# Patient Record
Sex: MALE | Race: WHITE | NOT HISPANIC OR LATINO | Employment: STUDENT | URBAN - METROPOLITAN AREA
[De-identification: names, ages, dates, MRNs, and addresses within clinical notes are randomized per-mention and may not be internally consistent; named-entity substitution may affect disease eponyms.]

---
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
## 2024-01-30 ENCOUNTER — OFFICE VISIT (OUTPATIENT)
Dept: FAMILY MEDICINE CLINIC | Facility: CLINIC | Age: 19
End: 2024-01-30
Payer: COMMERCIAL

## 2024-01-30 VITALS
OXYGEN SATURATION: 97 % | BODY MASS INDEX: 26.52 KG/M2 | WEIGHT: 175 LBS | HEIGHT: 68 IN | HEART RATE: 95 BPM | DIASTOLIC BLOOD PRESSURE: 98 MMHG | SYSTOLIC BLOOD PRESSURE: 132 MMHG | RESPIRATION RATE: 18 BRPM

## 2024-01-30 DIAGNOSIS — K21.9 GERD WITHOUT ESOPHAGITIS: ICD-10-CM

## 2024-01-30 DIAGNOSIS — R13.10 DYSPHAGIA, UNSPECIFIED TYPE: Primary | ICD-10-CM

## 2024-01-30 DIAGNOSIS — R41.840 EASILY DISTRACTABLE ON EXAMINATION: ICD-10-CM

## 2024-01-30 PROBLEM — F32.1 MODERATE SINGLE CURRENT EPISODE OF MAJOR DEPRESSIVE DISORDER: Status: RESOLVED | Noted: 2017-10-05 | Resolved: 2024-01-30

## 2024-01-30 PROBLEM — F43.10 PTSD (POST-TRAUMATIC STRESS DISORDER): Status: RESOLVED | Noted: 2017-10-05 | Resolved: 2024-01-30

## 2024-01-30 PROBLEM — S97.81XA CRUSH INJURY OF RIGHT FOOT: Status: ACTIVE | Noted: 2023-12-19

## 2024-01-30 PROCEDURE — 99213 OFFICE O/P EST LOW 20 MIN: CPT | Performed by: NURSE PRACTITIONER

## 2024-01-30 RX ORDER — FAMOTIDINE 20 MG/1
20 TABLET, FILM COATED ORAL 2 TIMES DAILY
Qty: 60 TABLET | Refills: 0 | Status: SHIPPED | OUTPATIENT
Start: 2024-01-30 | End: 2024-02-29

## 2024-01-30 NOTE — PROGRESS NOTES
"Subjective   Jonathon Forbes is a 18 y.o. male.     History of Present Illness   Estab pt     [place]ron[place] GERd x > 5 years. Here for worsening GERD and dysphagia. Things feel like they get stuck in throat and he has to drink a lot to swallow. Occasional vomiting if he does not have water he has to vomit to clear throat. No sore throat, no voice changes. No vaping, smoking, not a lot of caffeine.  No nausea or hematemesis. He has tried otc TUMs or pepcid in the past. Grandmother has \"ulcerated stomach\".     The following portions of the patient's history were reviewed and updated as appropriate: allergies, current medications, past family history, past medical history, past social history, past surgical history and problem list.    Review of Systems    Objective   Physical Exam  Vitals reviewed.   Constitutional:       Appearance: Normal appearance.   HENT:      Nose: Nose normal.      Mouth/Throat:      Mouth: Mucous membranes are moist.      Pharynx: No posterior oropharyngeal erythema.   Cardiovascular:      Rate and Rhythm: Normal rate and regular rhythm.      Pulses: Normal pulses.      Heart sounds: Normal heart sounds.   Pulmonary:      Effort: Pulmonary effort is normal.      Breath sounds: Normal breath sounds.   Abdominal:      General: Bowel sounds are normal.      Palpations: Abdomen is soft.   Musculoskeletal:         General: Normal range of motion.   Skin:     General: Skin is warm.   Neurological:      General: No focal deficit present.      Mental Status: He is alert.         Vitals:    01/30/24 0926   BP: 132/98   Pulse: 95   Resp: 18   SpO2: 97%     Body mass index is 26.61 kg/m².    Procedures    Assessment & Plan   Problems Addressed this Visit    None  Visit Diagnoses       Dysphagia, unspecified type    -  Primary    Relevant Orders    H. Pylori Breath Test - Breath, Lung    Ambulatory Referral to Gastroenterology    GERD without esophagitis        Relevant Medications    famotidine (Pepcid) 20 MG " tablet    Other Relevant Orders    H. Pylori Breath Test - Breath, Lung    Ambulatory Referral to Gastroenterology    Easily distractable on examination        Relevant Orders    Ambulatory Referral to Neuropsychology (Completed)          Diagnoses         Codes Comments    Dysphagia, unspecified type    -  Primary ICD-10-CM: R13.10  ICD-9-CM: 787.20     GERD without esophagitis     ICD-10-CM: K21.9  ICD-9-CM: 530.81     Easily distractable on examination     ICD-10-CM: R41.840  ICD-9-CM: 799.51           Dysphagia/ GERD- refer GI for scope and possible esophageal stretching, h pylori breath test today, focus on upright sitting, chin forward, thorough chewing. Rx pepcid 20 bid, limit triggers    Refer neuropsych for ADHD testing, follow routine          Education provided in AVS   Return if symptoms worsen or fail to improve.

## 2024-04-23 ENCOUNTER — TELEPHONE (OUTPATIENT)
Dept: GASTROENTEROLOGY | Facility: CLINIC | Age: 19
End: 2024-04-23

## 2024-04-23 ENCOUNTER — OFFICE VISIT (OUTPATIENT)
Dept: GASTROENTEROLOGY | Facility: CLINIC | Age: 19
End: 2024-04-23
Payer: COMMERCIAL

## 2024-04-23 VITALS
TEMPERATURE: 98.9 F | HEIGHT: 68 IN | HEART RATE: 85 BPM | WEIGHT: 188 LBS | SYSTOLIC BLOOD PRESSURE: 118 MMHG | BODY MASS INDEX: 28.49 KG/M2 | DIASTOLIC BLOOD PRESSURE: 81 MMHG

## 2024-04-23 DIAGNOSIS — R13.19 ESOPHAGEAL DYSPHAGIA: Primary | ICD-10-CM

## 2024-04-23 DIAGNOSIS — K21.9 GASTROESOPHAGEAL REFLUX DISEASE, UNSPECIFIED WHETHER ESOPHAGITIS PRESENT: ICD-10-CM

## 2024-04-23 PROCEDURE — 99204 OFFICE O/P NEW MOD 45 MIN: CPT | Performed by: INTERNAL MEDICINE

## 2024-04-23 RX ORDER — PANTOPRAZOLE SODIUM 40 MG/1
40 TABLET, DELAYED RELEASE ORAL DAILY
Qty: 30 TABLET | Refills: 5 | Status: SHIPPED | OUTPATIENT
Start: 2024-04-23

## 2024-04-23 NOTE — H&P (VIEW-ONLY)
Chief Complaint   Patient presents with    Heartburn    Difficulty Swallowing     Subjective   HPI  Jonathon Forbes is a 19 y.o. male who presents today for new patient evaluation.    He complains of heartburn and dysphagia.  Longstanding symptoms.  Describes sensation of food being slow to pass through the middle part of his chest he usually has to uma his solid food with liquids throughout his meals.  No history of a formal food impaction.  He has no history of environmental or seasonal allergies.  Was given a prescription for Pepcid which she took for about 30 days did not feel that this changed his symptoms in any way.  No prior upper endoscopy.    Objective   Vitals:    04/23/24 1413   BP: 118/81   Pulse: 85   Temp: 98.9 °F (37.2 °C)     Physical Exam  Vitals reviewed.   Constitutional:       Appearance: He is well-developed.   HENT:      Head: Normocephalic and atraumatic.   Neurological:      Mental Status: He is alert and oriented to person, place, and time.   Psychiatric:         Behavior: Behavior normal.         Thought Content: Thought content normal.         Judgment: Judgment normal.              Assessment & Plan   Assessment:     1. Esophageal dysphagia    2. Gastroesophageal reflux disease, unspecified whether esophagitis present      Plan:   Schedule EGD to evaluate for possible EoE  Start protonix 40mg/day          Sid Bills M.D.  [facility]  [address] [place] [address]  Office: [phone]

## 2024-04-23 NOTE — PROGRESS NOTES
Chief Complaint   Patient presents with    Heartburn    Difficulty Swallowing     Subjective   HPI  Jonathon Forbes is a 19 y.o. male who presents today for new patient evaluation.    He complains of heartburn and dysphagia.  Longstanding symptoms.  Describes sensation of food being slow to pass through the middle part of his chest he usually has to uma his solid food with liquids throughout his meals.  No history of a formal food impaction.  He has no history of environmental or seasonal allergies.  Was given a prescription for Pepcid which she took for about 30 days did not feel that this changed his symptoms in any way.  No prior upper endoscopy.    Objective   Vitals:    04/23/24 1413   BP: 118/81   Pulse: 85   Temp: 98.9 °F (37.2 °C)     Physical Exam  Vitals reviewed.   Constitutional:       Appearance: He is well-developed.   HENT:      Head: Normocephalic and atraumatic.   Neurological:      Mental Status: He is alert and oriented to person, place, and time.   Psychiatric:         Behavior: Behavior normal.         Thought Content: Thought content normal.         Judgment: Judgment normal.              Assessment & Plan   Assessment:     1. Esophageal dysphagia    2. Gastroesophageal reflux disease, unspecified whether esophagitis present      Plan:   Schedule EGD to evaluate for possible EoE  Start protonix 40mg/day          Sid Bills M.D.  [facility]  [address] [place] [address]  Office: [phone]

## 2024-05-13 ENCOUNTER — HOSPITAL ENCOUNTER (OUTPATIENT)
Facility: HOSPITAL | Age: 19
Setting detail: HOSPITAL OUTPATIENT SURGERY
Discharge: HOME | End: 2024-05-13
Attending: INTERNAL MEDICINE | Admitting: INTERNAL MEDICINE
Payer: COMMERCIAL

## 2024-05-13 ENCOUNTER — ANESTHESIA (OUTPATIENT)
Dept: GASTROENTEROLOGY | Facility: HOSPITAL | Age: 19
End: 2024-05-13
Payer: COMMERCIAL

## 2024-05-13 ENCOUNTER — ANESTHESIA EVENT (OUTPATIENT)
Dept: GASTROENTEROLOGY | Facility: HOSPITAL | Age: 19
End: 2024-05-13
Payer: COMMERCIAL

## 2024-05-13 VITALS
RESPIRATION RATE: 18 BRPM | HEIGHT: 68 IN | BODY MASS INDEX: 28.26 KG/M2 | HEART RATE: 64 BPM | WEIGHT: 186.5 LBS | OXYGEN SATURATION: 97 % | SYSTOLIC BLOOD PRESSURE: 117 MMHG | DIASTOLIC BLOOD PRESSURE: 78 MMHG

## 2024-05-13 DIAGNOSIS — R13.19 ESOPHAGEAL DYSPHAGIA: ICD-10-CM

## 2024-05-13 PROCEDURE — 25010000002 PROPOFOL 1000 MG/100ML EMULSION

## 2024-05-13 PROCEDURE — 43239 EGD BIOPSY SINGLE/MULTIPLE: CPT | Performed by: INTERNAL MEDICINE

## 2024-05-13 PROCEDURE — 25810000003 LACTATED RINGERS PER 1000 ML: Performed by: INTERNAL MEDICINE

## 2024-05-13 PROCEDURE — 25010000002 PROPOFOL 200 MG/20ML EMULSION

## 2024-05-13 PROCEDURE — 88305 TISSUE EXAM BY PATHOLOGIST: CPT | Performed by: INTERNAL MEDICINE

## 2024-05-13 RX ORDER — SODIUM CHLORIDE 0.9 % (FLUSH) 0.9 %
3-10 SYRINGE (ML) INJECTION AS NEEDED
Status: CANCELLED | OUTPATIENT
Start: 2024-05-13

## 2024-05-13 RX ORDER — SODIUM CHLORIDE 0.9 % (FLUSH) 0.9 %
3 SYRINGE (ML) INJECTION EVERY 12 HOURS SCHEDULED
Status: CANCELLED | OUTPATIENT
Start: 2024-05-13

## 2024-05-13 RX ORDER — PROPOFOL 10 MG/ML
INJECTION, EMULSION INTRAVENOUS CONTINUOUS PRN
Status: DISCONTINUED | OUTPATIENT
Start: 2024-05-13 | End: 2024-05-13 | Stop reason: SURG

## 2024-05-13 RX ORDER — LIDOCAINE HYDROCHLORIDE 10 MG/ML
0.5 INJECTION, SOLUTION INFILTRATION; PERINEURAL ONCE AS NEEDED
Status: CANCELLED | OUTPATIENT
Start: 2024-05-13

## 2024-05-13 RX ORDER — SODIUM CHLORIDE, SODIUM LACTATE, POTASSIUM CHLORIDE, CALCIUM CHLORIDE 600; 310; 30; 20 MG/100ML; MG/100ML; MG/100ML; MG/100ML
9 INJECTION, SOLUTION INTRAVENOUS CONTINUOUS
Status: CANCELLED | OUTPATIENT
Start: 2024-05-13

## 2024-05-13 RX ORDER — SODIUM CHLORIDE, SODIUM LACTATE, POTASSIUM CHLORIDE, CALCIUM CHLORIDE 600; 310; 30; 20 MG/100ML; MG/100ML; MG/100ML; MG/100ML
1000 INJECTION, SOLUTION INTRAVENOUS CONTINUOUS
Status: DISCONTINUED | OUTPATIENT
Start: 2024-05-13 | End: 2024-05-13 | Stop reason: HOSPADM

## 2024-05-13 RX ORDER — SODIUM CHLORIDE 0.9 % (FLUSH) 0.9 %
10 SYRINGE (ML) INJECTION AS NEEDED
Status: DISCONTINUED | OUTPATIENT
Start: 2024-05-13 | End: 2024-05-13 | Stop reason: HOSPADM

## 2024-05-13 RX ORDER — PROPOFOL 10 MG/ML
INJECTION, EMULSION INTRAVENOUS AS NEEDED
Status: DISCONTINUED | OUTPATIENT
Start: 2024-05-13 | End: 2024-05-13 | Stop reason: SURG

## 2024-05-13 RX ORDER — LIDOCAINE HYDROCHLORIDE 20 MG/ML
INJECTION, SOLUTION INFILTRATION; PERINEURAL AS NEEDED
Status: DISCONTINUED | OUTPATIENT
Start: 2024-05-13 | End: 2024-05-13 | Stop reason: SURG

## 2024-05-13 RX ADMIN — PROPOFOL INJECTABLE EMULSION 100 MG: 10 INJECTION, EMULSION INTRAVENOUS at 10:36

## 2024-05-13 RX ADMIN — PROPOFOL 250 MCG/KG/MIN: 10 INJECTION, EMULSION INTRAVENOUS at 10:38

## 2024-05-13 RX ADMIN — SODIUM CHLORIDE, POTASSIUM CHLORIDE, SODIUM LACTATE AND CALCIUM CHLORIDE 1000 ML: 600; 310; 30; 20 INJECTION, SOLUTION INTRAVENOUS at 10:17

## 2024-05-13 RX ADMIN — LIDOCAINE HYDROCHLORIDE 60 MG: 20 INJECTION, SOLUTION INFILTRATION; PERINEURAL at 10:36

## 2024-05-13 NOTE — ANESTHESIA POSTPROCEDURE EVALUATION
Patient: Jonathon Forbes    Procedure Summary       Date: 05/13/24 Room / Location:  [facility] /  [facility]    Anesthesia Start: 1033 Anesthesia Stop: 1051    Procedure: ESOPHAGOGASTRODUODENOSCOPY WITH BIOPSIES (Esophagus) Diagnosis:       Esophageal dysphagia      (Esophageal dysphagia [R13.19])    Surgeons: Sid Bills MD Provider: Jeremiah Lambert MD    Anesthesia Type: MAC ASA Status: 1            Anesthesia Type: MAC    Vitals  Vitals Value Taken Time   /81 05/13/24 1058   Temp     Pulse 60 05/13/24 1100   Resp 18 05/13/24 1057   SpO2 96 % 05/13/24 1100   Vitals shown include unfiled device data.        Post Anesthesia Care and Evaluation    Patient location during evaluation: bedside  Patient participation: complete - patient participated  Level of consciousness: awake and alert  Pain management: adequate    Airway patency: patent  Anesthetic complications: No anesthetic complications  PONV Status: controlled  Cardiovascular status: blood pressure returned to baseline and acceptable  Respiratory status: acceptable  Hydration status: acceptable

## 2024-05-13 NOTE — ANESTHESIA PREPROCEDURE EVALUATION
Anesthesia Evaluation     Patient summary reviewed and Nursing notes reviewed   no history of anesthetic complications:   NPO Solid Status: > 8 hours  NPO Liquid Status: > 2 hours           Airway   Dental      Pulmonary    Cardiovascular         Neuro/Psych  GI/Hepatic/Renal/Endo      Musculoskeletal     Abdominal    Substance History      OB/GYN          Other                          Anesthesia Plan    ASA 1     MAC     intravenous induction     Anesthetic plan, risks, benefits, and alternatives have been provided, discussed and informed consent has been obtained with: patient.        CODE STATUS:

## 2024-05-13 NOTE — DISCHARGE INSTRUCTIONS
For the next 24 hours patient needs to be with a responsible adult.    For 24 hours DO NOT drive, operate machinery, appliances, drink alcohol, make important decisions or sign legal documents.    Start with a light or bland diet if you are feeling sick to your stomach otherwise advance to regular diet as tolerated.    Follow recommendations on procedure report if provided by your doctor.    Call Dr Bills for problems .    Problems may include but not limited to: large amounts of bleeding, trouble breathing, repeated vomiting, severe unrelieved pain, fever or chills.

## 2024-05-14 LAB
LAB AP CASE REPORT: NORMAL
PATH REPORT.FINAL DX SPEC: NORMAL
PATH REPORT.GROSS SPEC: NORMAL

## 2024-06-04 ENCOUNTER — TELEPHONE (OUTPATIENT)
Dept: GASTROENTEROLOGY | Facility: CLINIC | Age: 19
End: 2024-06-04

## 2024-06-04 DIAGNOSIS — R13.19 ESOPHAGEAL DYSPHAGIA: Primary | ICD-10-CM

## 2024-06-04 DIAGNOSIS — K21.9 GASTROESOPHAGEAL REFLUX DISEASE, UNSPECIFIED WHETHER ESOPHAGITIS PRESENT: ICD-10-CM

## 2024-06-04 DIAGNOSIS — K20.0 EOSINOPHILIC ESOPHAGITIS: ICD-10-CM

## 2024-06-04 NOTE — TELEPHONE ENCOUNTER
Caller: Jonathon Forbes    Relationship: Self    Best call back number: [phone]     Caller requesting test results: PT     What test was performed: EGD    When was the test performed: 05/13/24    Where was the test performed: [facility]    \

## 2024-06-11 NOTE — TELEPHONE ENCOUNTER
Sid Bills MD Stowers, Sharon G, RN  Caller: Unspecified (1 week ago, 12:51 PM)  Results are suggestive of EoE  Please arrange referral to [facility], Dr Paulo Martinez for allergy testing  Please arrange for trial of topical budesonide 2mg BID for 12 weeks, sent to [facility] pharmacy    Office f/u in 8 weeks with me or SM

## 2024-06-11 NOTE — TELEPHONE ENCOUNTER
Called pt and advised of Dr Bills's note.  Pt verbalized understanding.     F/u appt w/IRAIDA Akins on 9/12 @11am.     Referral placed to Dr Martinez.

## 2024-06-12 ENCOUNTER — OFFICE VISIT (OUTPATIENT)
Dept: FAMILY MEDICINE CLINIC | Facility: CLINIC | Age: 19
End: 2024-06-12
Payer: COMMERCIAL

## 2024-06-12 VITALS
SYSTOLIC BLOOD PRESSURE: 142 MMHG | HEIGHT: 68 IN | WEIGHT: 186 LBS | HEART RATE: 64 BPM | RESPIRATION RATE: 18 BRPM | BODY MASS INDEX: 28.19 KG/M2 | DIASTOLIC BLOOD PRESSURE: 90 MMHG | OXYGEN SATURATION: 98 %

## 2024-06-12 DIAGNOSIS — R53.81 CHRONIC FATIGUE AND MALAISE: Primary | ICD-10-CM

## 2024-06-12 DIAGNOSIS — H53.9 CHANGE IN VISION: ICD-10-CM

## 2024-06-12 DIAGNOSIS — K20.0 ESOPHAGITIS, EOSINOPHILIC: ICD-10-CM

## 2024-06-12 DIAGNOSIS — R53.82 CHRONIC FATIGUE AND MALAISE: Primary | ICD-10-CM

## 2024-06-12 LAB
25(OH)D3+25(OH)D2 SERPL-MCNC: 22.7 NG/ML (ref 30–100)
ALBUMIN SERPL-MCNC: 4.9 G/DL (ref 3.5–5.2)
ALBUMIN/GLOB SERPL: 2.3 G/DL
ALP SERPL-CCNC: 96 U/L (ref 39–117)
ALT SERPL-CCNC: 25 U/L (ref 1–41)
AST SERPL-CCNC: 19 U/L (ref 1–40)
BASOPHILS # BLD AUTO: 0.04 10*3/MM3 (ref 0–0.2)
BASOPHILS NFR BLD AUTO: 0.5 % (ref 0–1.5)
BILIRUB BLD-MCNC: NEGATIVE MG/DL
BILIRUB SERPL-MCNC: 0.4 MG/DL (ref 0–1.2)
BUN SERPL-MCNC: 11 MG/DL (ref 6–20)
BUN/CREAT SERPL: 8.1 (ref 7–25)
CALCIUM SERPL-MCNC: 9.5 MG/DL (ref 8.6–10.5)
CHLORIDE SERPL-SCNC: 104 MMOL/L (ref 98–107)
CLARITY, POC: CLEAR
CO2 SERPL-SCNC: 26.6 MMOL/L (ref 22–29)
COLOR UR: YELLOW
CREAT SERPL-MCNC: 1.35 MG/DL (ref 0.76–1.27)
EGFRCR SERPLBLD CKD-EPI 2021: 77.1 ML/MIN/1.73
EOSINOPHIL # BLD AUTO: 0.17 10*3/MM3 (ref 0–0.4)
EOSINOPHIL NFR BLD AUTO: 2.2 % (ref 0.3–6.2)
ERYTHROCYTE [DISTWIDTH] IN BLOOD BY AUTOMATED COUNT: 12.3 % (ref 12.3–15.4)
FOLATE SERPL-MCNC: 12.8 NG/ML (ref 4.78–24.2)
GLOBULIN SER CALC-MCNC: 2.1 GM/DL
GLUCOSE SERPL-MCNC: 95 MG/DL (ref 65–99)
GLUCOSE UR STRIP-MCNC: NEGATIVE MG/DL
HCT VFR BLD AUTO: 47.5 % (ref 37.5–51)
HGB BLD-MCNC: 15.6 G/DL (ref 13–17.7)
IMM GRANULOCYTES # BLD AUTO: 0.01 10*3/MM3 (ref 0–0.05)
IMM GRANULOCYTES NFR BLD AUTO: 0.1 % (ref 0–0.5)
KETONES UR QL: NEGATIVE
LEUKOCYTE EST, POC: NEGATIVE
LYMPHOCYTES # BLD AUTO: 3.97 10*3/MM3 (ref 0.7–3.1)
LYMPHOCYTES NFR BLD AUTO: 50.8 % (ref 19.6–45.3)
MCH RBC QN AUTO: 27.6 PG (ref 26.6–33)
MCHC RBC AUTO-ENTMCNC: 32.8 G/DL (ref 31.5–35.7)
MCV RBC AUTO: 84.1 FL (ref 79–97)
MONOCYTES # BLD AUTO: 0.39 10*3/MM3 (ref 0.1–0.9)
MONOCYTES NFR BLD AUTO: 5 % (ref 5–12)
NEUTROPHILS # BLD AUTO: 3.23 10*3/MM3 (ref 1.7–7)
NEUTROPHILS NFR BLD AUTO: 41.4 % (ref 42.7–76)
NITRITE UR-MCNC: NEGATIVE MG/ML
NRBC BLD AUTO-RTO: 0 /100 WBC (ref 0–0.2)
PH UR: 6 [PH] (ref 5–8)
PLATELET # BLD AUTO: 311 10*3/MM3 (ref 140–450)
POTASSIUM SERPL-SCNC: 4 MMOL/L (ref 3.5–5.2)
PROT SERPL-MCNC: 7 G/DL (ref 6–8.5)
PROT UR STRIP-MCNC: NEGATIVE MG/DL
RBC # BLD AUTO: 5.65 10*6/MM3 (ref 4.14–5.8)
RBC # UR STRIP: NEGATIVE /UL
SODIUM SERPL-SCNC: 142 MMOL/L (ref 136–145)
SP GR UR: 1.03 (ref 1–1.03)
T4 FREE SERPL-MCNC: 1.41 NG/DL (ref 0.92–1.68)
TSH SERPL DL<=0.005 MIU/L-ACNC: 2.58 UIU/ML (ref 0.27–4.2)
UROBILINOGEN UR QL: NORMAL
VIT B12 SERPL-MCNC: 852 PG/ML (ref 211–946)
WBC # BLD AUTO: 7.81 10*3/MM3 (ref 3.4–10.8)

## 2024-06-12 PROCEDURE — 81002 URINALYSIS NONAUTO W/O SCOPE: CPT | Performed by: NURSE PRACTITIONER

## 2024-06-12 PROCEDURE — 99213 OFFICE O/P EST LOW 20 MIN: CPT | Performed by: NURSE PRACTITIONER

## 2024-06-12 RX ORDER — ERGOCALCIFEROL 1.25 MG/1
50000 CAPSULE ORAL WEEKLY
Qty: 8 CAPSULE | Refills: 0 | Status: SHIPPED | OUTPATIENT
Start: 2024-06-12 | End: 2024-08-11

## 2024-06-12 NOTE — PROGRESS NOTES
Subjective   Jonathon Forbes is a 19 y.o. male.     History of Present Illness   Estab pt here for acute issue    Acute malaise , fatigue and feeling unwell x 2 months. He feels like brain fog or dissociation. He does not see therapist or psychiatry. Has had a history of migraines without aura, but in last month has had a dark line in R eye vision in bottom right corner that comes and goes when he is looking at bright light or sun.   Last CBC 12/2023: wbc 7.7, hgb 15.4, ALT 66, AST 29. Creat 1.19., GFR 90  No changes to bowel, bladder, no new sex partners, no drugs or alcohol. 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 Sleeps 6-7 hrs most nights. He has meat heavy diet and veggies. He is drinking a lot of water. No chest pain, palps, no respiratory issues. No recent illness. No tick bites.    The following portions of the patient's history were reviewed and updated as appropriate: allergies, current medications, past family history, past medical history, past social history, past surgical history and problem list.    Review of Systems      Current Outpatient Medications:     pantoprazole (PROTONIX) 40 MG EC tablet, Take 1 tablet by mouth Daily., Disp: 30 tablet, Rfl: 5    Objective   Physical Exam  Vitals reviewed.   Constitutional:       Appearance: Normal appearance.   HENT:      Mouth/Throat:      Mouth: Mucous membranes are moist.   Eyes:      Extraocular Movements: Extraocular movements intact.      Pupils: Pupils are equal, round, and reactive to light.   Cardiovascular:      Rate and Rhythm: Normal rate and regular rhythm.      Pulses: Normal pulses.      Heart sounds: Normal heart sounds.   Pulmonary:      Effort: Pulmonary effort is normal.      Breath sounds: Normal breath sounds.   Abdominal:      General: Bowel sounds are normal.   Musculoskeletal:         General: Normal range of motion.      Cervical back: Normal range of motion.   Lymphadenopathy:      Cervical: No cervical adenopathy.  "  Skin:     General: Skin is warm.      Findings: No rash.   Neurological:      General: No focal deficit present.      Mental Status: He is alert.         Vitals:    06/12/24 0746   BP: 142/90   Pulse: 64   Resp: 18   SpO2: 98%     Body mass index is 28.28 kg/m².    Procedures    No results found for: \"CMP\", \"BMP\", \"TSH\", \"CBC\", \"HGBA1C\", \"LIPIDINTERP\"         Assessment & Plan   Problems Addressed this Visit    None  Visit Diagnoses       Chronic fatigue and malaise    -  Primary    Relevant Orders    Comprehensive Metabolic Panel    CBC & Differential    TSH    T4, Free    Vitamin B12    Folate    Vitamin D 25 hydroxy    POC Urinalysis Dipstick (Completed)    Change in vision        Esophagitis, eosinophilic              Diagnoses         Codes Comments    Chronic fatigue and malaise    -  Primary ICD-10-CM: R53.82, R53.81  ICD-9-CM: 780.71     Change in vision     ICD-10-CM: H53.9  ICD-9-CM: 368.9     Esophagitis, eosinophilic     ICD-10-CM: K20.0  ICD-9-CM: 530.13           Orders Placed This Encounter   Procedures    Comprehensive Metabolic Panel     Order Specific Question:   Release to patient     Answer:   Routine Release [[id number]]    TSH     Order Specific Question:   Release to patient     Answer:   Routine Release [[id number]]    T4, Free     Order Specific Question:   Release to patient     Answer:   Routine Release [[id number]]    Vitamin B12     Order Specific Question:   Release to patient     Answer:   Routine Release [[id number]]    Folate     Order Specific Question:   Release to patient     Answer:   Routine Release [[id number]]    Vitamin D 25 hydroxy     Order Specific Question:   Release to patient     Answer:   Routine Release [[id number]]    POC Urinalysis Dipstick     Order Specific Question:   Release to patient     Answer:   Routine Release [[id number]]    CBC & Differential     Order Specific Question:   Release to patient     Answer:   Routine Release [[id number]]     Malaise- check " labs, urine, focus on hydration, water, sleep, mvt daily, limit stress    Vision changes- get eye exam asap, reviewed migraine prevention, wear sunglasses when bright       Education provided in AVS   Return if symptoms worsen or fail to improve.

## 2024-07-18 ENCOUNTER — TELEPHONE (OUTPATIENT)
Dept: FAMILY MEDICINE CLINIC | Facility: CLINIC | Age: 19
End: 2024-07-18
Payer: COMMERCIAL

## 2024-07-18 DIAGNOSIS — F90.9 ATTENTION DEFICIT HYPERACTIVITY DISORDER (ADHD), UNSPECIFIED ADHD TYPE: Primary | ICD-10-CM

## 2024-07-18 NOTE — TELEPHONE ENCOUNTER
Caller: Jonathon Forbes    Relationship: Self    Best call back number: [phone]    What was the call regarding: PLEASE CALL PATIENT TO DO FOLLOW UP ABOUT HIS ADHD. PATIENT STATES THAT [facility]VES ADVISED THERE WOULD BE A BETTER DOCTOR IN THE PRACTICE THAT DEALS WITH THIS TO SEE. [facility] IS NOT SURE WHO THIS IS. PLEASE ADVISE.

## 2024-07-19 NOTE — TELEPHONE ENCOUNTER
Documenting on behalf of OTONIEL GRAY who spoke with the patient and informed them they will need to drop off a copy of their neuropsych testing records in order to be scheduled with ELMER Arizmendi, here in office. Patient verbalized an understanding. New referral placed for Octaviano Bianchi per previous one being .

## 2024-08-26 RX ORDER — ERGOCALCIFEROL 1.25 MG/1
50000 CAPSULE, LIQUID FILLED ORAL WEEKLY
Qty: 8 CAPSULE | Refills: 0 | OUTPATIENT
Start: 2024-08-26
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
## 2024-09-10 ENCOUNTER — OFFICE VISIT (OUTPATIENT)
Dept: BEHAVIORAL HEALTH | Facility: CLINIC | Age: 19
End: 2024-09-10
Payer: COMMERCIAL

## 2024-09-10 VITALS
SYSTOLIC BLOOD PRESSURE: 130 MMHG | BODY MASS INDEX: 28.34 KG/M2 | DIASTOLIC BLOOD PRESSURE: 80 MMHG | HEIGHT: 68 IN | HEART RATE: 60 BPM | OXYGEN SATURATION: 97 % | WEIGHT: 187 LBS

## 2024-09-10 DIAGNOSIS — Z79.899 HIGH RISK MEDICATION USE: ICD-10-CM

## 2024-09-10 DIAGNOSIS — F90.2 ADHD (ATTENTION DEFICIT HYPERACTIVITY DISORDER), COMBINED TYPE: Primary | ICD-10-CM

## 2024-09-10 PROCEDURE — 93000 ELECTROCARDIOGRAM COMPLETE: CPT

## 2024-09-10 PROCEDURE — 90792 PSYCH DIAG EVAL W/MED SRVCS: CPT

## 2024-09-10 RX ORDER — METHYLPHENIDATE HYDROCHLORIDE 10 MG/1
10 CAPSULE, EXTENDED RELEASE ORAL EVERY MORNING
Qty: 30 CAPSULE | Refills: 0 | Status: SHIPPED | OUTPATIENT
Start: 2024-09-10

## 2024-09-10 NOTE — PATIENT INSTRUCTIONS
-Stimulant/ADHD General Instructions:  Patient educated that they must call every month when they have 3 days left of medication to request a refill [phone].  Patient educated that prescribing will not continue unless follow-up appointments are maintained.  Prescriptions can only be sent to a [place] pharmacy, 30-day supply no refills.  Monitor for appetite suppression/weight loss, worsening anxiety, worsening sleep.  Remember to stay hydrated and snack throughout the day to avoid afternoon crash.  Any cardiovascular symptoms including chest pain, shortness of breath, dizziness, lightheadedness, stop medication and go to nearest emergency room or call 911.  Patient has been educated on the risk versus benefit of being prescribed a controlled substance, patient has been educated on additional monitoring that is required including Luan reports, random urine drug screens, in person appt's and pill counts. Controlled substance agreement renewed yearly. Patients are seen regularly while on a controlled substance.  Patient has been educated on nonstimulant options that carry with a much lower risk, and require less monitoring.     GENERAL NEW PATIENT INSTRUCTIONS:    -Please arrive in person or virtually 10-15 minutes prior to appointment to allow for registration/sign in/questionnaires. If you are seen virtually please review after visit summary (AVS)/patient instructions via [facility] for a summary of plan of care/changes in treatment plan. If you are having difficulties logging on or accessing [facility] please contact my office for assistance.    -The best way to get a hold of Leobardo is to call the office at [phone] and ask to leave a message with his medical assistant. Leobardo Bianchi is a Psychiatric Mental Health Nurse Practitioner, due to his specialty patient's are not able to message him directly via [facility]. Please give my office up to 48 hours to respond to a patient call/question/refill request. Refill  requests will be made during normal office hours only, Monday-Friday 8:00-5:30.      -Leobardo is out of the office on Fridays and weekends. Tuesdays and Thursdays are Leobardo's in-office days, Mondays and Wednesdays are his telehealth days.  The decision to be seen virtually or in person is up to the discretion of the provider, not all behavioral health problems are appropriate for telehealth.    -Please call the office at [phone] with any worsening of symptoms or onset of intolerable side effects.  -Follow-up appointments must be maintained in order for prescribing to continue.  -Patient has been educated regarding multimodal approach with healthy nutrition, healthy sleep, regular physical activity, social activities, counseling, and medications.   -Please call 911 or go to the nearest ER if you begin to have thoughts of harming yourself or other people.    No show policy:  We understand unexpected circumstances arise; however, anytime you miss your appointment we are unable to provide you appropriate care.  In addition, each appointment missed could have been used to provide care for others.  We ask that you call at least 24 hours in advance to cancel or reschedule an appointment. We would like to take this opportunity to remind you of our policy stating patients who miss THREE or more appointments without cancelling or rescheduling 24 hours in advance of the appointment may be subject to cancellation of any further visits with our clinic and recommendation to seek in-person services/visits. Please call [phone] to reschedule your appointment. If there are reasons that make it difficult for you to keep the appointments, please call and let us know how we can help. Please understand that medication prescribing will not continue without seeing your provider.

## 2024-09-10 NOTE — PROGRESS NOTES
Procedure     ECG 12 Lead    Date/Time: 9/10/2024 8:49 AM  Performed by: Octaviano Bianchi APRN    Authorized by: Octaviano Bianchi APRN  Comparison: not compared with previous ECG   Previous ECG: no previous ECG available  Rhythm: sinus rhythm  Rate: normal    Clinical impression: normal ECG  Clinical impression comment: artifact present in (1) lead  Comments: I had medical provider in office review EKG with myself, normal sinus rhythm.

## 2024-09-12 ENCOUNTER — OFFICE VISIT (OUTPATIENT)
Dept: GASTROENTEROLOGY | Facility: CLINIC | Age: 19
End: 2024-09-12
Payer: COMMERCIAL

## 2024-09-12 VITALS
DIASTOLIC BLOOD PRESSURE: 85 MMHG | TEMPERATURE: 97.3 F | WEIGHT: 185.2 LBS | BODY MASS INDEX: 28.07 KG/M2 | SYSTOLIC BLOOD PRESSURE: 148 MMHG | HEIGHT: 68 IN | HEART RATE: 78 BPM

## 2024-09-12 DIAGNOSIS — K20.0 EOSINOPHILIC ESOPHAGITIS: Primary | ICD-10-CM

## 2024-09-12 DIAGNOSIS — R13.19 ESOPHAGEAL DYSPHAGIA: ICD-10-CM

## 2024-09-12 RX ORDER — DUPILUMAB 300 MG/2ML
300 INJECTION, SOLUTION SUBCUTANEOUS
Start: 2024-09-12

## 2024-09-12 NOTE — PROGRESS NOTES
"Chief Complaint  Heartburn and Difficulty Swallowing    Subjective          History of Present Illness    Jonathon Forbes is a  19 y.o. male presents for follow-up on heartburn and dysphagia.  He is a patient of Dr. Bills last seen on 4/23/2024.  He is new to me.    Longstanding history of heartburn and dysphagia.  Food is slow to pass through the middle part of chest and chases with liquids.  No history of food impaction, allergies.  Took Pepcid for 30 days with no change in symptoms.  Started on Protonix 40 mg daily in April. Reports improvement in symptoms with PPI, still with some trouble swallowing dry food mostly.     5/13/2024 EGD showed evidence of EOE, normal stomach and duodenum.  Pathology was also consistent with EOE.  Negative for metaplasia.    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     Objective   Vital Signs:   /85   Pulse 78   Temp 97.3 °F (36.3 °C)   Ht 172.7 cm (68\")   Wt 84 kg (185 lb 3.2 oz)   BMI 28.16 kg/m²       Physical Exam  Vitals reviewed.   Constitutional:       General: He is awake. He is not in acute distress.     Appearance: Normal appearance. He is well-developed and well-groomed.   HENT:      Head: Normocephalic.   Pulmonary:      Effort: Pulmonary effort is normal. No respiratory distress.   Skin:     Coloration: Skin is not pale.   Neurological:      Mental Status: He is alert and oriented to person, place, and time.      Gait: Gait is intact.   Psychiatric:         Mood and Affect: Mood and affect normal.         Speech: Speech normal.         Behavior: Behavior is cooperative.         Judgment: Judgment normal.          Result Review :             Assessment and Plan    Diagnoses and all orders for this visit:    1. Eosinophilic esophagitis (Primary)    2. Esophageal dysphagia    Other orders  -     Dupilumab (Dupixent) 300 MG/2ML solution pen-injector; Inject 2 mL under the skin into the appropriate area as directed Every 7 (Seven) Days.    He does have some remaining " dysphagia although improved some with PPI. He also has eczema. Recommend proceeding with dupixient weekly injection for his EOE.  This may also help his eczema which is pretty severe and he reports nonresponsive to topical steroids. Continue PPI for now with goal weaning off in future if he responds well to Dupixent.    Follow Up   Return in about 3 months (around 12/16/2024).    Dragon dictation used throughout this note.     Mable Rockwell PA-C

## 2024-09-13 ENCOUNTER — SPECIALTY PHARMACY (OUTPATIENT)
Dept: GASTROENTEROLOGY | Facility: CLINIC | Age: 19
End: 2024-09-13
Payer: COMMERCIAL

## 2024-09-13 PROBLEM — K20.0 EOSINOPHILIC ESOPHAGITIS: Status: ACTIVE | Noted: 2024-09-13

## 2024-09-13 NOTE — PROGRESS NOTES
Specialty Pharmacy     PA approved for Dupixent  Key: [id number]  Request Reference Number: [id number]. DUPIXENT /2ML is approved through 09/12/2025.     [facility]       Kassiepasha Quezada  Specialty Pharmacy Technician

## 2024-09-16 ENCOUNTER — SPECIALTY PHARMACY (OUTPATIENT)
Dept: GASTROENTEROLOGY | Facility: CLINIC | Age: 19
End: 2024-09-16
Payer: COMMERCIAL

## 2024-09-20 ENCOUNTER — SPECIALTY PHARMACY (OUTPATIENT)
Dept: GASTROENTEROLOGY | Facility: CLINIC | Age: 19
End: 2024-09-20
Payer: COMMERCIAL

## 2024-09-20 DIAGNOSIS — K20.0 EOSINOPHILIC ESOPHAGITIS: Primary | ICD-10-CM

## 2024-09-20 RX ORDER — DUPILUMAB 300 MG/2ML
300 INJECTION, SOLUTION SUBCUTANEOUS
Qty: 24 ML | Refills: 3 | Status: SHIPPED | OUTPATIENT
Start: 2024-09-20

## 2024-09-26 ENCOUNTER — TELEPHONE (OUTPATIENT)
Dept: GASTROENTEROLOGY | Facility: CLINIC | Age: 19
End: 2024-09-26
Payer: COMMERCIAL

## 2024-09-26 NOTE — TELEPHONE ENCOUNTER
Caller: Jonathon Forbes    Relationship: Self    Best call back number: [phone]  *    What is the best time to reach you: ANYTIME    Who are you requesting to speak with (clinical staff, provider,  specific staff member): SPECIFIC STAFF MEMBER    Do you know the name of the person who called: RENA    What was the call regarding: DUPIXENT    Is it okay if the provider responds through MyChart: YES

## 2024-10-17 ENCOUNTER — TELEPHONE (OUTPATIENT)
Dept: GASTROENTEROLOGY | Facility: CLINIC | Age: 19
End: 2024-10-17
Payer: COMMERCIAL

## 2024-10-17 NOTE — TELEPHONE ENCOUNTER
Caller: Jonathon Forbes    Relationship: Self    Best call back number: [phone]    What is the best time to reach you: ANYTIME AFTER NOON    Who are you requesting to speak with (clinical staff, provider,  specific staff member):         What was the call regarding: ARAVIND ADVISED THIS PT TO CALL AND SCHEDULE AN APPT W/HER. PLEASE ASSIST.

## 2024-10-23 DIAGNOSIS — F90.2 ADHD (ATTENTION DEFICIT HYPERACTIVITY DISORDER), COMBINED TYPE: ICD-10-CM

## 2024-10-23 DIAGNOSIS — Z79.899 HIGH RISK MEDICATION USE: ICD-10-CM

## 2024-10-23 RX ORDER — METHYLPHENIDATE HYDROCHLORIDE 10 MG/1
10 CAPSULE, EXTENDED RELEASE ORAL EVERY MORNING
Qty: 30 CAPSULE | Refills: 0 | Status: SHIPPED | OUTPATIENT
Start: 2024-10-23

## 2024-10-23 NOTE — TELEPHONE ENCOUNTER
Last office visit: 9/10/24  Next office visit: 10/24/24 appointment with Octaviano Bianchi cancelled due to provider offboard.  Last refill: 9/10/24

## 2024-10-29 NOTE — TELEPHONE ENCOUNTER
I was OOO 10/17-10/27. Called and LVM for pt letting him know this and to call back to schedule a time for first dose.    Yany Espinal, PharmD, BCACP, BCPS, BCCCP

## 2024-10-30 ENCOUNTER — SPECIALTY PHARMACY (OUTPATIENT)
Dept: GASTROENTEROLOGY | Facility: CLINIC | Age: 19
End: 2024-10-30
Payer: COMMERCIAL

## 2024-10-30 NOTE — TELEPHONE ENCOUNTER
Delayed entry from 10/29/24: spoke to pt ~15:30 and he was able to do his first injection in his thigh last week. He did feel the injection more than expected, so we discussed alternative site of abdomen at least 2 inches from naval and he will try that for this week's injection. Otherwise his first dose went smoothly and he WCB for any questions/issues.    Yany Espinal, PharmD, BCACP, BCPS, BCCCP

## 2024-12-03 ENCOUNTER — OFFICE VISIT (OUTPATIENT)
Age: 19
End: 2024-12-03
Payer: COMMERCIAL

## 2024-12-03 DIAGNOSIS — F90.2 ADHD (ATTENTION DEFICIT HYPERACTIVITY DISORDER), COMBINED TYPE: ICD-10-CM

## 2024-12-03 DIAGNOSIS — Z79.899 HIGH RISK MEDICATION USE: ICD-10-CM
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
## 2024-12-09 DIAGNOSIS — F90.2 ADHD (ATTENTION DEFICIT HYPERACTIVITY DISORDER), COMBINED TYPE: ICD-10-CM

## 2024-12-09 DIAGNOSIS — Z79.899 HIGH RISK MEDICATION USE: ICD-10-CM

## 2024-12-09 RX ORDER — METHYLPHENIDATE HYDROCHLORIDE 20 MG/1
20 CAPSULE, EXTENDED RELEASE ORAL DAILY
Qty: 30 CAPSULE | Refills: 0 | Status: SHIPPED | OUTPATIENT
Start: 2024-12-09 | End: 2025-01-08

## 2024-12-09 RX ORDER — METHYLPHENIDATE HYDROCHLORIDE 10 MG/1
10 CAPSULE, EXTENDED RELEASE ORAL EVERY MORNING
Qty: 30 CAPSULE | Refills: 0 | Status: CANCELLED | OUTPATIENT
Start: 2024-12-09

## 2024-12-09 NOTE — TELEPHONE ENCOUNTER
+Rx Refill Note  Requested Prescriptions     Pending Prescriptions Disp Refills    methylphenidate LA (Ritalin LA) 10 MG 24 hr capsule 30 capsule 0     Sig: Take 1 capsule by mouth Every Morning      Last office visit with prescribing clinician: 12/3/2024   Last telemedicine visit with prescribing clinician: Visit date not found   Next office visit with prescribing clinician: 12/31/2024     Gayla Mauro MA  12/09/24, 16:00 EST

## 2024-12-19 ENCOUNTER — OFFICE VISIT (OUTPATIENT)
Dept: GASTROENTEROLOGY | Facility: CLINIC | Age: 19
End: 2024-12-19
Payer: COMMERCIAL

## 2024-12-19 VITALS
WEIGHT: 178.6 LBS | TEMPERATURE: 96.9 F | SYSTOLIC BLOOD PRESSURE: 124 MMHG | DIASTOLIC BLOOD PRESSURE: 75 MMHG | HEIGHT: 68 IN | BODY MASS INDEX: 27.07 KG/M2 | HEART RATE: 69 BPM

## 2024-12-19 DIAGNOSIS — K21.9 GASTROESOPHAGEAL REFLUX DISEASE, UNSPECIFIED WHETHER ESOPHAGITIS PRESENT: ICD-10-CM

## 2024-12-19 DIAGNOSIS — K20.0 EOSINOPHILIC ESOPHAGITIS: Primary | ICD-10-CM

## 2024-12-19 NOTE — PROGRESS NOTES
"Chief Complaint  Eosinophilic esophagitis    Subjective          History of Present Illness    Jonathon Forbes is a  19 y.o. male presents for follow-up on heartburn and dysphagia with history of EOE. He is a patient of Dr. Bills last seen by me on 9/12/2024.    Longstanding history of heartburn and dysphagia. Food is slow to pass through the middle part of chest and chases with liquids. No history of food impaction, allergies.  At last visit we started him on Dupixent for EOE after failing PPI.  Today he reports improvement in trouble swallowing since last visit. Occasional reflux 5-6 times/month. No longer on PPI/antacid    Failed Pepcid daily, pantoprazole 40 mg daily-helps some.    From 9/12/2024 office note:  5/13/2024 EGD showed evidence of EOE, normal stomach and duodenum.  Pathology was also consistent with EOE.  Negative for metaplasia.     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     Objective   Vital Signs:   /75   Pulse 69   Temp 96.9 °F (36.1 °C)   Ht 172.7 cm (68\")   Wt 81 kg (178 lb 9.6 oz)   BMI 27.16 kg/m²       Physical Exam  Vitals reviewed.   Constitutional:       General: He is awake. He is not in acute distress.     Appearance: Normal appearance. He is well-developed and well-groomed.   HENT:      Head: Normocephalic.   Pulmonary:      Effort: Pulmonary effort is normal. No respiratory distress.   Skin:     Coloration: Skin is not pale.   Neurological:      Mental Status: He is alert and oriented to person, place, and time.      Gait: Gait is intact.   Psychiatric:         Mood and Affect: Mood and affect normal.         Speech: Speech normal.         Behavior: Behavior is cooperative.         Judgment: Judgment normal.          Result Review :             Assessment and Plan    Diagnoses and all orders for this visit:    1. Eosinophilic esophagitis (Primary)    2. Gastroesophageal reflux disease, unspecified whether esophagitis present    Continue dupixient, ok to take pepcid as needed for " reflux flares.   Consider repeating EGD late next year.       Follow Up   Return in about 6 months (around 6/19/2025).    Dragon dictation used throughout this note.     Mable Rockwell PA-C

## 2024-12-31 ENCOUNTER — OFFICE VISIT (OUTPATIENT)
Age: 19
End: 2024-12-31
Payer: COMMERCIAL

## 2024-12-31 VITALS
HEIGHT: 70 IN | OXYGEN SATURATION: 99 % | BODY MASS INDEX: 25.05 KG/M2 | DIASTOLIC BLOOD PRESSURE: 89 MMHG | SYSTOLIC BLOOD PRESSURE: 153 MMHG | WEIGHT: 175 LBS | HEART RATE: 104 BPM

## 2024-12-31 DIAGNOSIS — Z79.899 HIGH RISK MEDICATION USE: ICD-10-CM

## 2024-12-31 DIAGNOSIS — F90.2 ADHD (ATTENTION DEFICIT HYPERACTIVITY DISORDER), COMBINED TYPE: Primary | ICD-10-CM
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
## 2025-01-02 LAB
AMPHETAMINES UR QL SCN: NEGATIVE NG/ML
BARBITURATES UR QL SCN: NEGATIVE NG/ML
BENZODIAZ UR QL SCN: NEGATIVE NG/ML
BZE UR QL SCN: NEGATIVE NG/ML
CANNABINOIDS UR QL SCN: NEGATIVE NG/ML
CREAT UR-MCNC: 346 MG/DL (ref 20–300)
LABORATORY COMMENT REPORT: ABNORMAL
METHADONE UR QL SCN: NEGATIVE NG/ML
OPIATES UR QL SCN: NEGATIVE NG/ML
OXYCODONE+OXYMORPHONE UR QL SCN: NEGATIVE NG/ML
PCP UR QL: NEGATIVE NG/ML
PH UR: 5 [PH] (ref 4.5–8.9)
PROPOXYPH UR QL SCN: NEGATIVE NG/ML

## 2025-01-24 DIAGNOSIS — F90.2 ADHD (ATTENTION DEFICIT HYPERACTIVITY DISORDER), COMBINED TYPE: ICD-10-CM

## 2025-01-24 RX ORDER — METHYLPHENIDATE HYDROCHLORIDE 20 MG/1
20 CAPSULE, EXTENDED RELEASE ORAL DAILY
Qty: 30 CAPSULE | Refills: 0 | Status: CANCELLED | OUTPATIENT
Start: 2025-01-24 | End: 2025-02-23

## 2025-01-24 RX ORDER — METHYLPHENIDATE HYDROCHLORIDE 30 MG/1
30 CAPSULE, EXTENDED RELEASE ORAL DAILY
Qty: 30 CAPSULE | Refills: 0 | Status: SHIPPED | OUTPATIENT
Start: 2025-01-24 | End: 2025-02-23

## 2025-01-24 NOTE — TELEPHONE ENCOUNTER
Rx Refill Note  Requested Prescriptions     Pending Prescriptions Disp Refills    methylphenidate LA (Ritalin LA) 20 MG 24 hr capsule 30 capsule 0     Sig: Take 1 capsule by mouth Daily for 30 days      Last office visit with prescribing clinician: 12/31/2024   Last telemedicine visit with prescribing clinician: Visit date not found   Next office visit with prescribing clinician: 2/4/2025     Gayla Mauro MA  01/24/25, 11:38 EST

## 2025-02-04 ENCOUNTER — OFFICE VISIT (OUTPATIENT)
Age: 20
End: 2025-02-04
Payer: COMMERCIAL

## 2025-02-04 VITALS
DIASTOLIC BLOOD PRESSURE: 77 MMHG | BODY MASS INDEX: 25.05 KG/M2 | SYSTOLIC BLOOD PRESSURE: 155 MMHG | OXYGEN SATURATION: 98 % | WEIGHT: 175 LBS | HEIGHT: 70 IN | HEART RATE: 85 BPM

## 2025-02-04 DIAGNOSIS — F90.2 ADHD (ATTENTION DEFICIT HYPERACTIVITY DISORDER), COMBINED TYPE: Primary | ICD-10-CM
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
## 2025-03-07 DIAGNOSIS — F90.2 ADHD (ATTENTION DEFICIT HYPERACTIVITY DISORDER), COMBINED TYPE: ICD-10-CM

## 2025-03-07 RX ORDER — METHYLPHENIDATE HYDROCHLORIDE 30 MG/1
30 CAPSULE, EXTENDED RELEASE ORAL DAILY
Qty: 30 CAPSULE | Refills: 0 | Status: SHIPPED | OUTPATIENT
Start: 2025-03-07 | End: 2025-04-06

## 2025-03-07 NOTE — TELEPHONE ENCOUNTER
Rx Refill Note  Requested Prescriptions     Pending Prescriptions Disp Refills    methylphenidate LA (Ritalin LA) 30 MG 24 hr capsule 30 capsule 0     Sig: Take 1 capsule by mouth Daily for 30 days      Last office visit with prescribing clinician: Visit date not found   Last telemedicine visit with prescribing clinician: Visit date not found   Next office visit with prescribing clinician: Visit date not found     Current UDS on file? [] Yes [x] No  Lab :    [place] in media? [x] Yes [] No    Gayla Mauro MA  03/07/25, 13:59 EST

## 2025-04-21 ENCOUNTER — OFFICE VISIT (OUTPATIENT)
Dept: FAMILY MEDICINE CLINIC | Facility: CLINIC | Age: 20
End: 2025-04-21
Payer: COMMERCIAL

## 2025-04-21 VITALS
DIASTOLIC BLOOD PRESSURE: 84 MMHG | BODY MASS INDEX: 26.05 KG/M2 | RESPIRATION RATE: 18 BRPM | HEART RATE: 89 BPM | OXYGEN SATURATION: 98 % | SYSTOLIC BLOOD PRESSURE: 130 MMHG | HEIGHT: 70 IN | WEIGHT: 182 LBS

## 2025-04-21 DIAGNOSIS — E55.9 VITAMIN D DEFICIENCY: ICD-10-CM

## 2025-04-21 DIAGNOSIS — F90.2 ATTENTION DEFICIT HYPERACTIVITY DISORDER (ADHD), COMBINED TYPE: ICD-10-CM

## 2025-04-21 DIAGNOSIS — Z11.59 ENCOUNTER FOR HEPATITIS C SCREENING TEST FOR LOW RISK PATIENT: ICD-10-CM

## 2025-04-21 DIAGNOSIS — R53.82 CHRONIC FATIGUE: ICD-10-CM

## 2025-04-21 DIAGNOSIS — Z00.00 ANNUAL PHYSICAL EXAM: Primary | ICD-10-CM

## 2025-04-21 PROBLEM — T74.22XA: Status: RESOLVED | Noted: 2017-10-05 | Resolved: 2025-04-21

## 2025-04-21 PROBLEM — S97.81XA CRUSH INJURY OF RIGHT FOOT: Status: RESOLVED | Noted: 2023-12-19 | Resolved: 2025-04-21

## 2025-04-21 PROBLEM — E83.59 NEPHROCALCINOSIS: Status: ACTIVE | Noted: 2025-04-21

## 2025-04-21 PROBLEM — F41.9 ANXIETY DISORDER OF ADOLESCENCE: Status: ACTIVE | Noted: 2025-04-21

## 2025-04-21 PROBLEM — N29 NEPHROCALCINOSIS: Status: ACTIVE | Noted: 2025-04-21

## 2025-04-21 PROBLEM — N13.30 ACQUIRED HYDRONEPHROSIS: Status: ACTIVE | Noted: 2025-04-21

## 2025-04-21 PROCEDURE — 99395 PREV VISIT EST AGE 18-39: CPT | Performed by: NURSE PRACTITIONER

## 2025-04-21 NOTE — PROGRESS NOTES
Helen Forbes is a 20 y.o. male.   Patient here for annual physical exam, labs, chronic illness management and updated screening.      History of Present Illness     The following portions of the patient's history were reviewed and updated as appropriate: allergies, current medications, past family history, past medical history, past social history, past surgical history and problem list.       The patient is a 20-year-old male who presents for an annual exam.    He has been experiencing persistent fatigue, characterized by a constant feeling of tiredness and the ability to fall asleep at any point during the day. This has been ongoing for more than 6 months. Despite modifications in his diet and increased physical activity, there has been no improvement in his symptoms. He does not report snoring at night and maintains a sleep schedule of over 7 hours per night. He does not use any sleep trackers. His daily routine includes regular exercise, and his diet consists mainly of chicken, turkey, beef, white rice, asparagus, and broccoli. He ensures adequate hydration and protein intake. He does not report any depressive symptoms contributing to his fatigue. 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   e reports no changes in his urine, which he describes as clear, light yellow, and odorless. He does not experience palpitations or dry mouth upon waking. He consumes alcohol minimally and reports no new sexual partners or exposure to STIs. He reports regular bowel movements and no leg or ankle swelling. He has always been proficient in weightlifting but struggles with cardio exercises. He experiences excessive sweating, which he considers normal for him. He does not take any pre-workout supplements or other supplements apart from a multivitamin. He avoids caffeine. He is sexually active and reports no erectile dysfunction. He occasionally  experiences weakness, which forrest him from exercising.    He has a history of low vitamin D levels, which were treated without significant improvement in his symptoms. He is currently on a daily multivitamin regimen.    He has ADHD and is under the care of a psychiatrist, taking Ritalin as part of his treatment plan. He occasionally experiences anxiety related to his ADHD but does not feel overly stressed. He reports no thoughts of self-harm.    He is on Dupixent for eosinophilic esophagitis, which also helps with his eczema. His symptoms are well controlled now that he has started Dupixent. He reports no unusual bleeding, bruising, enlarged lymph nodes, night sweats, alternating fevers, or chills. He does not report any significant joint pain or back pain. He does not report any changes in bowel habits such as constipation or diarrhea. He does not report any vomiting. He does not report any respiratory issues such as wheezing or shortness of breath and has never needed an inhaler. He does not report any testicular pain, swelling, or urinary changes.    SOCIAL HISTORY  He drinks alcohol minimally.    FAMILY HISTORY  He reports that all family members are still living and doing okay.       Review of Systems   Constitutional:  Positive for fatigue. Negative for activity change, chills, diaphoresis, fever and unexpected weight loss.   HENT:  Negative for congestion.         Dental exam    Eyes:  Negative for visual disturbance.        Eye exam is utd      Respiratory:  Negative for cough, shortness of breath and wheezing.    Cardiovascular:  Negative for chest pain, palpitations and leg swelling.   Gastrointestinal:  Positive for GERD. Negative for abdominal distention, blood in stool, constipation and diarrhea.   Endocrine: Negative for cold intolerance, heat intolerance, polydipsia, polyphagia and polyuria.   Genitourinary:  Negative for frequency, testicular pain and urinary incontinence.   Musculoskeletal:   Negative for arthralgias and back pain.   Skin:  Positive for rash (chronic eczema).   Allergic/Immunologic: Negative for environmental allergies.   Neurological:  Negative for weakness and headache.   Hematological:  Negative for adenopathy. Does not bruise/bleed easily.   Psychiatric/Behavioral:  Negative for sleep disturbance, suicidal ideas, depressed mood and stress. The patient is nervous/anxious (ADHD).          Current Outpatient Medications:     Dupilumab (Dupixent) 300 MG/2ML solution pen-injector, Inject 2 mL under the skin into the appropriate area as directed Every 7 (Seven) Days., Disp: 24 mL, Rfl: 3    methylphenidate LA (Ritalin LA) 30 MG 24 hr capsule, Take 1 capsule by mouth Daily for 30 days, Disp: 30 capsule, Rfl: 0    Objective   Physical Exam  Vitals reviewed.   Constitutional:       Appearance: Normal appearance. He is normal weight.   HENT:      Head: Normocephalic.      Right Ear: Tympanic membrane normal.      Left Ear: Tympanic membrane normal.      Nose: Nose normal.      Mouth/Throat:      Mouth: Mucous membranes are moist.   Eyes:      Pupils: Pupils are equal, round, and reactive to light.   Cardiovascular:      Rate and Rhythm: Normal rate and regular rhythm.      Pulses: Normal pulses.      Heart sounds: Normal heart sounds.   Pulmonary:      Effort: Pulmonary effort is normal.      Breath sounds: Normal breath sounds.   Abdominal:      General: Abdomen is flat. Bowel sounds are normal.      Palpations: Abdomen is soft.   Musculoskeletal:         General: Normal range of motion.      Cervical back: Normal range of motion.   Skin:     General: Skin is warm.   Neurological:      General: No focal deficit present.      Mental Status: He is alert.   Psychiatric:         Mood and Affect: Mood normal.         Vitals:    04/21/25 1341   BP: 130/84   Pulse: 89   Resp: 18   SpO2: 98%     Body mass index is 26.11 kg/m².    Procedures    TSH   Date Value Ref Range Status   06/12/2024 2.580  0.270 - 4.200 uIU/mL Final                   Assessment & Plan   Problems Addressed this Visit    None  Visit Diagnoses         Annual physical exam    -  Primary    Relevant Orders    Comprehensive Metabolic Panel    CBC & Differential    Lipid Panel With / Chol / HDL Ratio    TSH    T4, Free      Attention deficit hyperactivity disorder (ADHD), combined type          Chronic fatigue          Vitamin D deficiency        Relevant Orders    Vitamin D,25-Hydroxy      Encounter for hepatitis C screening test for low risk patient        Relevant Orders    Hepatitis C Antibody          Diagnoses         Codes Comments      Annual physical exam    -  Primary ICD-10-CM: Z00.00  ICD-9-CM: V70.0       Attention deficit hyperactivity disorder (ADHD), combined type     ICD-10-CM: F90.2  ICD-9-CM: 314.01       Chronic fatigue     ICD-10-CM: R53.82  ICD-9-CM: 780.79       Vitamin D deficiency     ICD-10-CM: E55.9  ICD-9-CM: 268.9       Encounter for hepatitis C screening test for low risk patient     ICD-10-CM: Z11.59  ICD-9-CM: V73.89             Assessment & Plan  1. Annual examination.  - Overall health status is satisfactory, with no significant concerns identified during this visit.  - Deviated septum noted, but no reported snoring issues.  - Stamina at the gym is commendable, and exhibits a muscular physique; ability to exercise and exert remains consistent.  - Urine is clear, light yellow, and odorless; excessive sweating may be a normal physiological response.    2. Fatigue.  - Persistent fatigue reported despite regular exercise, balanced diet, and adequate sleep.  - No feelings of depression or excessive stress; advised to discuss with psychiatrist for potential ADHD medication dosage adjustment.  - Blood work ordered to rule out thyroid issues, anemia, and vitamin D deficiency.  - If all other tests return normal, testosterone level check can be considered, which needs to be done in the morning.    3. ADHD.  -  Currently on Ritalin and sees a psychiatrist; advised to discuss pervasive fatigue with psychiatrist.  - Blood work ordered to rule out other causes of fatigue.    4. Eosinophilic esophagitis.  - On Dupixent, which has significantly improved symptoms; advised to continue medication.  - Monitor for signs of immunocompromise, such as unusual bleeding or bruising.    5. Eczema.  - Well-controlled with Dupixent; no additional dermatology follow-up necessary at this time.    6. Low vitamin D.  - Currently taking a daily multivitamin; vitamin D levels will be checked as part of the blood work to ensure they are within the normal range.      Preventative care- Follow heart healthy diet, drink water, walk daily. Wear seatbelts, wear helmets, wear sunscreens. Follow CDC guidelines for covid and flu .          Education provided in AVS   Return in about 1 year (around 4/21/2026) for Annual physical.    Patient or patient representative verbalized consent for the use of Ambient Listening during the visit with  ELMER Longoria for chart documentation. 4/21/2025  15:07 EDT

## 2025-04-22 LAB
25(OH)D3+25(OH)D2 SERPL-MCNC: 22.9 NG/ML (ref 30–100)
ALBUMIN SERPL-MCNC: 4.6 G/DL (ref 3.5–5.2)
ALBUMIN/GLOB SERPL: 2.4 G/DL
ALP SERPL-CCNC: 106 U/L (ref 39–117)
ALT SERPL-CCNC: 19 U/L (ref 1–41)
AST SERPL-CCNC: 17 U/L (ref 1–40)
BASOPHILS # BLD AUTO: 0.04 10*3/MM3 (ref 0–0.2)
BASOPHILS NFR BLD AUTO: 0.7 % (ref 0–1.5)
BILIRUB SERPL-MCNC: 0.2 MG/DL (ref 0–1.2)
BUN SERPL-MCNC: 16 MG/DL (ref 6–20)
BUN/CREAT SERPL: 12.6 (ref 7–25)
CALCIUM SERPL-MCNC: 9.1 MG/DL (ref 8.6–10.5)
CHLORIDE SERPL-SCNC: 106 MMOL/L (ref 98–107)
CHOLEST SERPL-MCNC: 183 MG/DL (ref 0–200)
CHOLEST/HDLC SERPL: 4.69 {RATIO}
CO2 SERPL-SCNC: 25.5 MMOL/L (ref 22–29)
CREAT SERPL-MCNC: 1.27 MG/DL (ref 0.76–1.27)
EGFRCR SERPLBLD CKD-EPI 2021: 82.4 ML/MIN/1.73
EOSINOPHIL # BLD AUTO: 0.07 10*3/MM3 (ref 0–0.4)
EOSINOPHIL NFR BLD AUTO: 1.2 % (ref 0.3–6.2)
ERYTHROCYTE [DISTWIDTH] IN BLOOD BY AUTOMATED COUNT: 12.1 % (ref 12.3–15.4)
GLOBULIN SER CALC-MCNC: 1.9 GM/DL
GLUCOSE SERPL-MCNC: 90 MG/DL (ref 65–99)
HCT VFR BLD AUTO: 44.7 % (ref 37.5–51)
HCV IGG SERPL QL IA: NON REACTIVE
HDLC SERPL-MCNC: 39 MG/DL (ref 40–60)
HGB BLD-MCNC: 14.6 G/DL (ref 13–17.7)
IMM GRANULOCYTES # BLD AUTO: 0.01 10*3/MM3 (ref 0–0.05)
IMM GRANULOCYTES NFR BLD AUTO: 0.2 % (ref 0–0.5)
LDLC SERPL CALC-MCNC: 98 MG/DL (ref 0–100)
LYMPHOCYTES # BLD AUTO: 2.45 10*3/MM3 (ref 0.7–3.1)
LYMPHOCYTES NFR BLD AUTO: 40.8 % (ref 19.6–45.3)
MCH RBC QN AUTO: 28.2 PG (ref 26.6–33)
MCHC RBC AUTO-ENTMCNC: 32.7 G/DL (ref 31.5–35.7)
MCV RBC AUTO: 86.3 FL (ref 79–97)
MONOCYTES # BLD AUTO: 0.33 10*3/MM3 (ref 0.1–0.9)
MONOCYTES NFR BLD AUTO: 5.5 % (ref 5–12)
NEUTROPHILS # BLD AUTO: 3.11 10*3/MM3 (ref 1.7–7)
NEUTROPHILS NFR BLD AUTO: 51.6 % (ref 42.7–76)
NRBC BLD AUTO-RTO: 0 /100 WBC (ref 0–0.2)
PLATELET # BLD AUTO: 329 10*3/MM3 (ref 140–450)
POTASSIUM SERPL-SCNC: 4.5 MMOL/L (ref 3.5–5.2)
PROT SERPL-MCNC: 6.5 G/DL (ref 6–8.5)
RBC # BLD AUTO: 5.18 10*6/MM3 (ref 4.14–5.8)
SODIUM SERPL-SCNC: 144 MMOL/L (ref 136–145)
T4 FREE SERPL-MCNC: 1.28 NG/DL (ref 0.92–1.68)
TRIGL SERPL-MCNC: 274 MG/DL (ref 0–150)
TSH SERPL DL<=0.005 MIU/L-ACNC: 1.03 UIU/ML (ref 0.27–4.2)
VLDLC SERPL CALC-MCNC: 46 MG/DL (ref 5–40)
WBC # BLD AUTO: 6.01 10*3/MM3 (ref 3.4–10.8)

## 2025-04-22 RX ORDER — ERGOCALCIFEROL 1.25 MG/1
50000 CAPSULE, LIQUID FILLED ORAL WEEKLY
Qty: 8 CAPSULE | Refills: 0 | Status: SHIPPED | OUTPATIENT
Start: 2025-04-22 | End: 2025-06-21

## 2025-05-28 DIAGNOSIS — F90.2 ADHD (ATTENTION DEFICIT HYPERACTIVITY DISORDER), COMBINED TYPE: ICD-10-CM

## 2025-05-28 RX ORDER — METHYLPHENIDATE HYDROCHLORIDE 30 MG/1
30 CAPSULE, EXTENDED RELEASE ORAL DAILY
Qty: 30 CAPSULE | Refills: 0 | Status: SHIPPED | OUTPATIENT
Start: 2025-05-28 | End: 2025-06-27

## 2025-05-28 NOTE — TELEPHONE ENCOUNTER
Rx Refill Note  Requested Prescriptions     Pending Prescriptions Disp Refills    methylphenidate LA (Ritalin LA) 30 MG 24 hr capsule 30 capsule 0     Sig: Take 1 capsule by mouth Daily for 30 days      Last office visit with prescribing clinician: 2/4/2025   Last telemedicine visit with prescribing clinician: Visit date not found   Next office visit with prescribing clinician: 6/6/2025     Gayla Mauro MA  05/28/25, 15:26 EDT

## 2025-06-06 ENCOUNTER — OFFICE VISIT (OUTPATIENT)
Age: 20
End: 2025-06-06
Payer: COMMERCIAL

## 2025-06-06 VITALS
WEIGHT: 180 LBS | HEART RATE: 60 BPM | OXYGEN SATURATION: 97 % | BODY MASS INDEX: 25.77 KG/M2 | DIASTOLIC BLOOD PRESSURE: 72 MMHG | SYSTOLIC BLOOD PRESSURE: 140 MMHG | HEIGHT: 70 IN

## 2025-06-06 DIAGNOSIS — F90.2 ADHD (ATTENTION DEFICIT HYPERACTIVITY DISORDER), COMBINED TYPE: Primary | ICD-10-CM

## 2025-06-06 NOTE — PROGRESS NOTES
"Subjective   Jonathon Forbes is a 20 y.o. male who presents today in follow up for management of ADHD, combined type.    Patient reports that he is doing well overall and does endorse sustained improvement ADHD symptoms associated with current dosage of methylphenidate LA.  It is of note that the patient did decide to \"take a break\" from his methylphenidate in an attempt to better understand his baseline level of functioning.  Patient did ultimately go approximately 8 weeks without any medication for management of his ADHD and reports a rather significant exacerbation of symptoms experienced during this period.  Patient's prescription was refilled and dispensed on 5/30/2025 with the patient reporting consistent compliance ever since.  He denies experiencing any significant side effects including any insomnia, increased levels of anxiety/restlessness or decreased appetite.  After taking this break from his medication patient reports that he is better able to perceive therapeutic benefit associated with his current dosage of methylphenidate and reports a significant decrease in levels of distractibility, improved ability to initiating complete tasks, improved organization overall, decreased procrastination, decreased forgetfulness, improved ability to remain still/seated at appropriate times, improved ability to wait his turn both in conversation and in line, and improved ability to pay attention/listen when directly spoken to.  He once again emphasizes the rather significant exacerbation/worsening of the symptoms but not taking his prescribed medication and does feel as if his current dosage is appropriately managing his ADHD symptoms.  Patient does also report experiencing a depressed mood associated with decreased levels of energy/fatigue, intermittent anhedonia, low self worth and excessive feelings of guilt lasting several days at a time on multiple occasions during this recent 8-week period in which she decided to " hold his prescribed methylphenidate.  He does continue to endorse a mildly depressed mood and some depressive symptoms but reports a rather significant improvement in these symptoms following reinitiation of his prescribed medication.  Patient otherwise denies any auditory, visual, or tactile hallucinations and does not currently appear to be responding to internal stimuli.  Patient denies any generalized paranoia and displays no evidence of delusional thinking.    The following portions of the patient's history were reviewed and updated as appropriate: allergies, current medications, past family history, past medical history, past social history, past surgical history and problem list.  History of Present Illness               Past Medical History:  Past Medical History:   Diagnosis Date    ADHD     Anxiety     Eczema     GERD (gastroesophageal reflux disease)     Orthostatic hypotension        Social History:  Social History     Socioeconomic History    Marital status: Significant Other     Spouse name: Mary    Number of children: 0    Highest education level: Some college, no degree   Tobacco Use    Smoking status: Never     Passive exposure: Past    Smokeless tobacco: Never   Vaping Use    Vaping status: Never Used   Substance and Sexual Activity    Alcohol use: Not Currently    Drug use: Never    Sexual activity: Yes     Partners: Female     Birth control/protection: Condom       Family History:  Family History   Problem Relation Age of Onset    No Known Problems Mother     No Known Problems Father     Stroke Maternal Grandfather        Past Surgical History:  Past Surgical History:   Procedure Laterality Date    ENDOSCOPY N/A 5/13/2024    Procedure: ESOPHAGOGASTRODUODENOSCOPY WITH BIOPSIES;  Surgeon: Sid Bills MD;  Location: [facility] ENDOSCOPY;  Service: Gastroenterology;  Laterality: N/A;  PRE: DYSPHAGIA  POST: ESOPHAGITIS    FOOT SURGERY Right 2023    4/5th toe    NASAL SEPTUM SURGERY      WRIST  "SURGERY Left        Problem List:  Patient Active Problem List   Diagnosis    Moderate major depression, single episode    Posttraumatic stress disorder    Esophageal dysphagia    Eosinophilic esophagitis    ADHD (attention deficit hyperactivity disorder), combined type    Acquired hydronephrosis    Anxiety disorder of adolescence    Nephrocalcinosis       Allergy:   No Known Allergies     Current Medications:   Current Outpatient Medications   Medication Sig Dispense Refill    Dupilumab (Dupixent) 300 MG/2ML solution pen-injector Inject 2 mL under the skin into the appropriate area as directed Every 7 (Seven) Days. 24 mL 3    methylphenidate LA (Ritalin LA) 30 MG 24 hr capsule Take 1 capsule by mouth Daily for 30 days 30 capsule 0    vitamin D (ERGOCALCIFEROL) 1.25 MG (76655 UT) capsule capsule Take 1 capsule by mouth 1 (One) Time Per Week for 60 days. 8 capsule 0     No current facility-administered medications for this visit.       Review of Symptoms:    Review of Systems   Constitutional:  Positive for activity change and fatigue.   HENT:  Negative for tinnitus.    Eyes:  Negative for visual disturbance.   Endocrine: Negative for cold intolerance and heat intolerance.   Skin:  Negative for rash.   Neurological:  Negative for seizures and confusion.   Psychiatric/Behavioral:  Positive for decreased concentration, sleep disturbance and depressed mood. Negative for hallucinations, self-injury and suicidal ideas. The patient is nervous/anxious.          Physical Exam:   Blood pressure 140/72, pulse 60, height 177.8 cm (70\"), weight 81.6 kg (180 lb), SpO2 97%.  Appearance: Appears documented age, appropriate hygiene and grooming.  Gait, Station, Strength: Normal gait, station and strength.  Physical Exam               Mental Status Exam:   Hygiene:   good  Cooperation:  Cooperative  Eye Contact:  Good  Psychomotor Behavior:  Appropriate  Affect:  Full range and Appropriate  Mood: normal and euthymic  Hopelessness: " Denies  Speech:  Normal  Thought Process:  Goal directed and Linear  Thought Content:  Normal  Suicidal:  None  Homicidal:  None  Hallucinations:  None  Delusion:  None  Memory:  Intact  Orientation:  Person, Place, Time, and Situation  Reliability:  good  Insight:  Good  Judgement:  Good  Impulse Control:  Good      Lab Results:   No visits with results within 1 Month(s) from this visit.   Latest known visit with results is:   Office Visit on 04/21/2025   Component Date Value Ref Range Status    Glucose 04/21/2025 90  65 - 99 mg/dL Final    BUN 04/21/2025 16  6 - 20 mg/dL Final    Creatinine 04/21/2025 1.27  0.76 - 1.27 mg/dL Final    EGFR Result 04/21/2025 82.4  >60.0 mL/min/1.73 Final    Comment: GFR Categories in Chronic Kidney Disease (CKD)/X09/  /X09/  GFR Category          GFR (mL/min/1.73)    Interpretation  G1/X09/                    90 or greater/X09/        Normal or high  (1)  G2//                    60-89                Mild decrease  (1)  G3a                   45-59                Mild to moderate  decrease  G3b                   30-44                Moderate to  severe decrease  G4                    15-29                Severe decrease  G5                    14 or less           Kidney failure//  /[id number]/  (1)In the absence of evidence of kidney disease, neither  GFR category G1 or G2 fulfill the criteria for CKD.  eGFR calculation 2021 CKD-EPI creatinine equation, which  does not include race as a factor      BUN/Creatinine Ratio 04/21/2025 12.6  7.0 - 25.0 Final    Sodium 04/21/2025 144  136 - 145 mmol/L Final    Potassium 04/21/2025 4.5  3.5 - 5.2 mmol/L Final    Chloride 04/21/2025 106  98 - 107 mmol/L Final    Total CO2 04/21/2025 25.5  22.0 - 29.0 mmol/L Final    Calcium 04/21/2025 9.1  8.6 - 10.5 mg/dL Final    Total Protein 04/21/2025 6.5  6.0 - 8.5 g/dL Final    Albumin 04/21/2025 4.6  3.5 - 5.2 g/dL Final    Globulin 04/21/2025 1.9  gm/dL Final    A/G Ratio 04/21/2025 2.4  g/dL  Final    Total Bilirubin 04/21/2025 0.2  0.0 - 1.2 mg/dL Final    Alkaline Phosphatase 04/21/2025 106  39 - 117 U/L Final    AST (SGOT) 04/21/2025 17  1 - 40 U/L Final    ALT (SGPT) 04/21/2025 19  1 - 41 U/L Final    WBC 04/21/2025 6.01  3.40 - 10.80 10*3/mm3 Final    RBC 04/21/2025 5.18  4.14 - 5.80 10*6/mm3 Final    Hemoglobin 04/21/2025 14.6  13.0 - 17.7 g/dL Final    Hematocrit 04/21/2025 44.7  37.5 - 51.0 % Final    MCV 04/21/2025 86.3  79.0 - 97.0 fL Final    MCH 04/21/2025 28.2  26.6 - 33.0 pg Final    MCHC 04/21/2025 32.7  31.5 - 35.7 g/dL Final    RDW 04/21/2025 12.1 (L)  12.3 - 15.4 % Final    Platelets 04/21/2025 329  140 - 450 10*3/mm3 Final    Neutrophil Rel % 04/21/2025 51.6  42.7 - 76.0 % Final    Lymphocyte Rel % 04/21/2025 40.8  19.6 - 45.3 % Final    Monocyte Rel % 04/21/2025 5.5  5.0 - 12.0 % Final    Eosinophil Rel % 04/21/2025 1.2  0.3 - 6.2 % Final    Basophil Rel % 04/21/2025 0.7  0.0 - 1.5 % Final    Neutrophils Absolute 04/21/2025 3.11  1.70 - 7.00 10*3/mm3 Final    Lymphocytes Absolute 04/21/2025 2.45  0.70 - 3.10 10*3/mm3 Final    Monocytes Absolute 04/21/2025 0.33  0.10 - 0.90 10*3/mm3 Final    Eosinophils Absolute 04/21/2025 0.07  0.00 - 0.40 10*3/mm3 Final    Basophils Absolute 04/21/2025 0.04  0.00 - 0.20 10*3/mm3 Final    Immature Granulocyte Rel % 04/21/2025 0.2  0.0 - 0.5 % Final    Immature Grans Absolute 04/21/2025 0.01  0.00 - 0.05 10*3/mm3 Final    nRBC 04/21/2025 0.0  0.0 - 0.2 /100 WBC Final    Total Cholesterol 04/21/2025 183  0 - 200 mg/dL Final    Comment: Cholesterol Reference Ranges  (U.S. Department of Health and Human Services ATP III  Classifications)  Desirable          <200 mg/dL  Borderline High    200-239 mg/dL  High Risk          >240 mg/dL  Triglyceride Reference Ranges  (U.S. Department of Health and Human Services ATP III  Classifications)  Normal           <150 mg/dL  Borderline High  150-199 mg/dL  High             200-499 mg/dL  Very High        >500  mg/dL  HDL Reference Ranges  (U.S. Department of Health and Human Services ATP III  Classifications)  Low     <40 mg/dl (major risk factor for CHD)  High    >60 mg/dl ('negative' risk factor for CHD)  LDL Reference Ranges  (U.S. Department of Health and Human Services ATP III  Classifications)  Optimal          <100 mg/dL  Near Optimal     100-129 mg/dL  Borderline High  130-159 mg/dL  High             160-189 mg/dL  Very High        >189 mg/dL  LDL is calculated using the NIH LDL-C calculation.      Triglycerides 04/21/2025 274 (H)  0 - 150 mg/dL Final    HDL Cholesterol 04/21/2025 39 (L)  40 - 60 mg/dL Final    VLDL Cholesterol Alberto 04/21/2025 46 (H)  5 - 40 mg/dL Final    LDL Chol Calc ([facility]) 04/21/2025 98  0 - 100 mg/dL Final    Chol/HDL Ratio 04/21/2025 4.69   Final    TSH 04/21/2025 1.030  0.270 - 4.200 uIU/mL Final    Free T4 04/21/2025 1.28  0.92 - 1.68 ng/dL Final    25 Hydroxy, Vitamin D 04/21/2025 22.9 (L)  30.0 - 100.0 ng/ml Final    Comment: Reference Range for Total Vitamin D 25(OH)  Deficiency <20.0 ng/mL  Insufficiency 21-29 ng/mL  Sufficiency  ng/mL  Toxicity >100 ng/ml      Hep C Virus Ab 04/21/2025 Non Reactive  Non Reactive Final    Comment: HCV antibody alone does not differentiate between previously  resolved infection and active infection. Equivocal and Reactive  HCV antibody results should be followed up with an HCV RNA test  to support the diagnosis of active HCV infection.       Results            PHQ-9 Total Score: 7    LILI-7 Total Score: 7     Assessment & Plan    Diagnoses and all orders for this visit:    1. ADHD (attention deficit hyperactivity disorder), combined type (Primary)         Jonathon Forbes is a 20 y.o. male who presents today in follow up for management of  ADHD, combined type.    As detailed above patient appears to be doing well overall and does report continued improvement in ADHD symptoms associated with current dosage of methylphenidate LA.  As detailed above  patient did decide to hold his prescribed methylphenidate for roughly 8 weeks in an attempt to better understand his baseline level of functioning which did ultimately result in a rather significant exacerbation/worsening of his ADHD symptoms which did negatively impact various aspects of his daily functioning.  Patient does also report experiencing the onset of various depressive symptoms also associated with his decision to not take his prescribed methylphenidate for approximately 8 weeks as evidenced by current PHQ-9 and LILI-7 scores.  Patient's methylphenidate was most recently dispensed on 5/30/2025 with the patient reporting consistent compliance with this medication ever since.  As detailed above he does report a rather significant improvement in his ADHD symptoms following his reinitiation of methylphenidate in addition to a noticeable improvement in mood and associated depressive symptoms.  I do feel as if patient's depressive symptoms are directly related to his unmanaged ADHD, I believe that they will continue to improve with consistent compliance with his prescribed medications.  Ongoing compliance was emphasized with the patient at today's visit especially considering his reported worsening of various symptoms upon his recent attempt to go without this medication.  We will continue monitoring for the persistent/worsening of any depressive symptoms we will further discuss potential treatment options if necessary pending tolerance/response to current dosage of methylphenidate.  Otherwise, patient will be seen again in approximately 8 weeks for reassessment.  Patient was understanding of this and is agreeable to today's plan.    Medications:  -Continue methylphenidate LA 30 mg p.o. once daily for management of ADHD, combined type.  Last prescription dispensed 5/30/2025.    Labs:  -Urine drug screen obtained 12/31/2024 within normal limits.    TREATMENT PLAN - SHORT AND LONG-TERM GOALS:   -Continue  supportive psychotherapy efforts and medications as indicated. Treatment and medication options discussed during today's visit.   -Patient acknowledged and verbally consented to continue with current treatment plan and was educated on the importance of compliance with treatment and follow-up appointments.    SUMMARY/EDUCATION/DISCUSSION:  -Pt was given appropriate time to ask questions and concerns were addressed. A thorough discussion was had that included review of disease process, need for continued monitoring and additional treatment options including use of pharmacological and non-pharmacological approaches to care, decisions were made and agreed upon by patient and provider.   -Discussed medication options and treatment plan of prescribed medication as well as the risks, benefits, and side effects including potential falls, possible impaired driving and metabolic adversities among others; patient acknowledged and provided verbal consent.   -Patient has been educated regarding multimodal approach with healthy nutrition, healthy sleep, regular physical activity, social activities, counseling, and medications.  -Please call the office at [phone] within normal business hours (Monday-Friday, 8:00 AM - 4:30 PM) with any worsening of symptoms or onset of intolerable side effects. Please ask to leave a message with office staff.  Please allow up to 24-48 hours for response to a patient call/question/refill request.  -Safety plan has been established and discussed in detail with the patient, who is agreeable to contact support system and/or call 911 or go to the nearest ER should he/she/they have any thoughts of harm to self or others.    MEDS ORDERED DURING VISIT:  No orders of the defined types were placed in this encounter.      FOLLOW UP:  Return in about 8 weeks (around 8/1/2025) for Next scheduled follow up.      Jem Gupta,     This document has been electronically signed by Jem Gupta,  DO  June 6, 2025 12:05 EDT    Part of this note may be an electronic transcription/translation of spoken language to printed text using the Dragon Dictation System. Some of the data in this electronic note has been brought forward from a previous encounter, any necessary changes have been made, it has been reviewed by this provider, and it is accurate.

## 2025-07-08 DIAGNOSIS — F90.2 ADHD (ATTENTION DEFICIT HYPERACTIVITY DISORDER), COMBINED TYPE: ICD-10-CM

## 2025-07-08 RX ORDER — METHYLPHENIDATE HYDROCHLORIDE 30 MG/1
30 CAPSULE, EXTENDED RELEASE ORAL DAILY
Qty: 30 CAPSULE | Refills: 0 | Status: SHIPPED | OUTPATIENT
Start: 2025-07-08 | End: 2025-08-07

## 2025-07-08 NOTE — TELEPHONE ENCOUNTER
Rx Refill Note  Requested Prescriptions     Pending Prescriptions Disp Refills    methylphenidate LA (Ritalin LA) 30 MG 24 hr capsule 30 capsule 0     Sig: Take 1 capsule by mouth Daily for 30 days      Last office visit with prescribing clinician: 6/6/2025   Last telemedicine visit with prescribing clinician: Visit date not found   Next office visit with prescribing clinician: 8/1/2025     Gayla Mauro MA  07/08/25, 13:40 EDT

## 2025-08-01 ENCOUNTER — OFFICE VISIT (OUTPATIENT)
Age: 20
End: 2025-08-01
Payer: COMMERCIAL

## 2025-08-01 VITALS
HEART RATE: 88 BPM | WEIGHT: 180 LBS | HEIGHT: 70 IN | BODY MASS INDEX: 25.77 KG/M2 | OXYGEN SATURATION: 98 % | SYSTOLIC BLOOD PRESSURE: 140 MMHG | DIASTOLIC BLOOD PRESSURE: 80 MMHG

## 2025-08-01 DIAGNOSIS — F90.2 ADHD (ATTENTION DEFICIT HYPERACTIVITY DISORDER), COMBINED TYPE: Primary | ICD-10-CM

## 2025-08-01 NOTE — PROGRESS NOTES
Subjective   Jonathon Forbes is a 20 y.o. male who presents today in follow up for management of ADHD, combined type.    Patient reports sustained improvement in ADHD symptoms associated with current dosage of methylphenidate LA and reports improved compliance/consistency in regards to taking his medication on a daily basis as discussed at last visit.  Patient reports only missing roughly 2 total doses over the past 8 weeks and states that he has made an effort to ensure that he takes his medication around the same time each morning.  After consistently taking his medication for several weeks patient reports complete resolution of previously reported depressive and anxious symptoms with the patient stating that he currently feels as if he has returned to his baseline level of functioning.  In regard to his ADHD symptoms patient reports an improved ability to initiate/complete necessary tasks associated with decreased levels of distractibility overall, decreased procrastination, decreased frequency of forgetfulness, improved ability to remain still/seated when necessary, decreased frequency of excessive talking/interrupting others, and improved organization overall.  He denies any side effects associated with current dosage of methylphenidate LA including any insomnia, decreased appetite, or increased levels of anxiety/restlessness.  Patient currently denies a depressed mood as well as any other significant depressive symptoms including any anhedonia, low self worth, or feelings of hopelessness.  Patient currently denies any active suicidal ideation, intent or plan.  Patient also reports decreased levels of anxiety overall associated with improved ability to control/manage his anxiety worry.  He denies worrying excessively about most things on most days in addition to catastrophizing or thinking overstay scenarios. Patient otherwise denies any auditory, visual, or tactile hallucinations and does not currently appear to  be responding to internal stimuli.  Patient denies any generalized paranoia and displays no evidence of delusional thinking.    The following portions of the patient's history were reviewed and updated as appropriate: allergies, current medications, past family history, past medical history, past social history, past surgical history and problem list.  History of Present Illness         Past Medical History:  Past Medical History:   Diagnosis Date    ADHD     Anxiety     Eczema     GERD (gastroesophageal reflux disease)     Orthostatic hypotension        Social History:  Social History     Socioeconomic History    Marital status: Significant Other     Spouse name: Mary    Number of children: 0    Highest education level: Some college, no degree   Tobacco Use    Smoking status: Never     Passive exposure: Past    Smokeless tobacco: Never   Vaping Use    Vaping status: Never Used   Substance and Sexual Activity    Alcohol use: Not Currently    Drug use: Never    Sexual activity: Yes     Partners: Female     Birth control/protection: Condom       Family History:  Family History   Problem Relation Age of Onset    No Known Problems Mother     No Known Problems Father     Stroke Maternal Grandfather        Past Surgical History:  Past Surgical History:   Procedure Laterality Date    ENDOSCOPY N/A 5/13/2024    Procedure: ESOPHAGOGASTRODUODENOSCOPY WITH BIOPSIES;  Surgeon: Sid Bills MD;  Location: [facility] ENDOSCOPY;  Service: Gastroenterology;  Laterality: N/A;  PRE: DYSPHAGIA  POST: ESOPHAGITIS    FOOT SURGERY Right 2023    4/5th toe    NASAL SEPTUM SURGERY      WRIST SURGERY Left        Problem List:  Patient Active Problem List   Diagnosis    Moderate major depression, single episode    Posttraumatic stress disorder    Esophageal dysphagia    Eosinophilic esophagitis    ADHD (attention deficit hyperactivity disorder), combined type    Acquired hydronephrosis    Anxiety disorder of adolescence     "Nephrocalcinosis       Allergy:   No Known Allergies     Current Medications:   Current Outpatient Medications   Medication Sig Dispense Refill    Dupilumab (Dupixent) 300 MG/2ML solution pen-injector Inject 2 mL under the skin into the appropriate area as directed Every 7 (Seven) Days. 24 mL 3    methylphenidate LA (Ritalin LA) 30 MG 24 hr capsule Take 1 capsule by mouth Daily for 30 days 30 capsule 0     No current facility-administered medications for this visit.       Review of Symptoms:    Review of Systems   Constitutional:  Negative for activity change and fatigue.   HENT:  Negative for tinnitus.    Eyes:  Negative for visual disturbance.   Cardiovascular:  Negative for chest pain and palpitations.   Endocrine: Negative for cold intolerance and heat intolerance.   Skin:  Negative for rash.   Neurological:  Negative for seizures and confusion.   Psychiatric/Behavioral:  Positive for decreased concentration. Negative for hallucinations, sleep disturbance, suicidal ideas and depressed mood. The patient is not nervous/anxious.          Physical Exam:   Blood pressure 140/80, pulse 88, height 177.8 cm (70\"), weight 81.6 kg (180 lb), SpO2 98%.  Appearance: Appears documented age, appropriate hygiene and grooming.  Gait, Station, Strength: Normal gait, station and strength.  Physical Exam         Mental Status Exam:   Hygiene:   good  Cooperation:  Cooperative  Eye Contact:  Good  Psychomotor Behavior:  Appropriate  Affect:  Full range and Appropriate  Mood: normal and euthymic  Hopelessness: Denies  Speech:  Normal  Thought Process:  Goal directed and Linear  Thought Content:  Normal  Suicidal:  None  Homicidal:  None  Hallucinations:  None  Delusion:  None  Memory:  Intact  Orientation:  Person, Place, Time, and Situation  Reliability:  good  Insight:  Good  Judgement:  Good  Impulse Control:  Good      Lab Results:   No visits with results within 1 Month(s) from this visit.   Latest known visit with results is: "   Office Visit on 04/21/2025   Component Date Value Ref Range Status    Glucose 04/21/2025 90  65 - 99 mg/dL Final    BUN 04/21/2025 16  6 - 20 mg/dL Final    Creatinine 04/21/2025 1.27  0.76 - 1.27 mg/dL Final    EGFR Result 04/21/2025 82.4  >60.0 mL/min/1.73 Final    Comment: GFR Categories in Chronic Kidney Disease (CKD)/X09/  /X09/  GFR Category          GFR (mL/min/1.73)    Interpretation  G1/X09/                    90 or greater/X09/        Normal or high  (1)  G2//                    60-89                Mild decrease  (1)  G3a                   45-59                Mild to moderate  decrease  G3b                   30-44                Moderate to  severe decrease  G4                    15-29                Severe decrease  G5                    14 or less           Kidney failure//  /[id number]/  (1)In the absence of evidence of kidney disease, neither  GFR category G1 or G2 fulfill the criteria for CKD.  eGFR calculation 2021 CKD-EPI creatinine equation, which  does not include race as a factor      BUN/Creatinine Ratio 04/21/2025 12.6  7.0 - 25.0 Final    Sodium 04/21/2025 144  136 - 145 mmol/L Final    Potassium 04/21/2025 4.5  3.5 - 5.2 mmol/L Final    Chloride 04/21/2025 106  98 - 107 mmol/L Final    Total CO2 04/21/2025 25.5  22.0 - 29.0 mmol/L Final    Calcium 04/21/2025 9.1  8.6 - 10.5 mg/dL Final    Total Protein 04/21/2025 6.5  6.0 - 8.5 g/dL Final    Albumin 04/21/2025 4.6  3.5 - 5.2 g/dL Final    Globulin 04/21/2025 1.9  gm/dL Final    A/G Ratio 04/21/2025 2.4  g/dL Final    Total Bilirubin 04/21/2025 0.2  0.0 - 1.2 mg/dL Final    Alkaline Phosphatase 04/21/2025 106  39 - 117 U/L Final    AST (SGOT) 04/21/2025 17  1 - 40 U/L Final    ALT (SGPT) 04/21/2025 19  1 - 41 U/L Final    WBC 04/21/2025 6.01  3.40 - 10.80 10*3/mm3 Final    RBC 04/21/2025 5.18  4.14 - 5.80 10*6/mm3 Final    Hemoglobin 04/21/2025 14.6  13.0 - 17.7 g/dL Final    Hematocrit 04/21/2025 44.7  37.5 - 51.0 % Final    MCV  04/21/2025 86.3  79.0 - 97.0 fL Final    MCH 04/21/2025 28.2  26.6 - 33.0 pg Final    MCHC 04/21/2025 32.7  31.5 - 35.7 g/dL Final    RDW 04/21/2025 12.1 (L)  12.3 - 15.4 % Final    Platelets 04/21/2025 329  140 - 450 10*3/mm3 Final    Neutrophil Rel % 04/21/2025 51.6  42.7 - 76.0 % Final    Lymphocyte Rel % 04/21/2025 40.8  19.6 - 45.3 % Final    Monocyte Rel % 04/21/2025 5.5  5.0 - 12.0 % Final    Eosinophil Rel % 04/21/2025 1.2  0.3 - 6.2 % Final    Basophil Rel % 04/21/2025 0.7  0.0 - 1.5 % Final    Neutrophils Absolute 04/21/2025 3.11  1.70 - 7.00 10*3/mm3 Final    Lymphocytes Absolute 04/21/2025 2.45  0.70 - 3.10 10*3/mm3 Final    Monocytes Absolute 04/21/2025 0.33  0.10 - 0.90 10*3/mm3 Final    Eosinophils Absolute 04/21/2025 0.07  0.00 - 0.40 10*3/mm3 Final    Basophils Absolute 04/21/2025 0.04  0.00 - 0.20 10*3/mm3 Final    Immature Granulocyte Rel % 04/21/2025 0.2  0.0 - 0.5 % Final    Immature Grans Absolute 04/21/2025 0.01  0.00 - 0.05 10*3/mm3 Final    nRBC 04/21/2025 0.0  0.0 - 0.2 /100 WBC Final    Total Cholesterol 04/21/2025 183  0 - 200 mg/dL Final    Comment: Cholesterol Reference Ranges  (U.S. Department of Health and Human Services ATP III  Classifications)  Desirable          <200 mg/dL  Borderline High    200-239 mg/dL  High Risk          >240 mg/dL  Triglyceride Reference Ranges  (U.S. Department of Health and Human Services ATP III  Classifications)  Normal           <150 mg/dL  Borderline High  150-199 mg/dL  High             200-499 mg/dL  Very High        >500 mg/dL  HDL Reference Ranges  (U.S. Department of Health and Human Services ATP III  Classifications)  Low     <40 mg/dl (major risk factor for CHD)  High    >60 mg/dl ('negative' risk factor for CHD)  LDL Reference Ranges  (U.S. Department of Health and Human Services ATP III  Classifications)  Optimal          <100 mg/dL  Near Optimal     100-129 mg/dL  Borderline High  130-159 mg/dL  High             160-189 mg/dL  Very High         >189 mg/dL  LDL is calculated using the NIH LDL-C calculation.      Triglycerides 04/21/2025 274 (H)  0 - 150 mg/dL Final    HDL Cholesterol 04/21/2025 39 (L)  40 - 60 mg/dL Final    VLDL Cholesterol Alberto 04/21/2025 46 (H)  5 - 40 mg/dL Final    LDL Chol Calc (NIH) 04/21/2025 98  0 - 100 mg/dL Final    Chol/HDL Ratio 04/21/2025 4.69   Final    TSH 04/21/2025 1.030  0.270 - 4.200 uIU/mL Final    Free T4 04/21/2025 1.28  0.92 - 1.68 ng/dL Final    25 Hydroxy, Vitamin D 04/21/2025 22.9 (L)  30.0 - 100.0 ng/ml Final    Comment: Reference Range for Total Vitamin D 25(OH)  Deficiency <20.0 ng/mL  Insufficiency 21-29 ng/mL  Sufficiency  ng/mL  Toxicity >100 ng/ml      Hep C Virus Ab 04/21/2025 Non Reactive  Non Reactive Final    Comment: HCV antibody alone does not differentiate between previously  resolved infection and active infection. Equivocal and Reactive  HCV antibody results should be followed up with an HCV RNA test  to support the diagnosis of active HCV infection.       Results    PHQ-9 Total Score: 5    LILI-7 Total Score: 4     Assessment & Plan    Diagnoses and all orders for this visit:    1. ADHD (attention deficit hyperactivity disorder), combined type (Primary)         Jonathon Forbes is a 20 y.o. male who presents today in follow up for management of ADHD, combined type.    As detailed above patient appears to be doing well overall and does endorse a rather noticeable improvement in depressive and anxious symptoms following reinitiation and more consistent use of methylphenidate LA at last visit roughly 8 weeks ago.  Patient reports consistent compliance with this medication after making significant effort to ensure that he take his medication around the same time each day, stating that he has only missed approximately 2 total doses over the past 8 weeks.  After more consistently taking his prescribed medication patient reports a rather significant benefit in his previously reported depressive/anxious  symptoms with complete resolution after approximately 2 weeks.  Patient continues to report resolution of these depressive and anxious symptoms as evidenced by current PHQ-9 and LILI-7 scores.  He also reports adequate management of ADHD symptoms associated with current dosage of methylphenidate LA.  As such, I recommend making no medication changes at this time and once again emphasized the importance of ongoing compliance with his prescribed medication with the patient at today's visit.  Given complete resolution of depressive/anxious symptoms following reinitiation/improve compliance with prescribed methylphenidate LA I do not feel as if patient requires pharmacological treatment for management of depressive/anxious symptoms at this time.  We will continue to monitor for the recurrence of such symptoms but I do feel that if patient remains compliant with his prescribed medication it is very unlikely that he will experience significant exacerbation/worsening of previously reported symptoms depressive/anxious symptoms.  Otherwise, patient will be seen again here in the clinic in approximately 12 weeks for reassessment.  Patient voices understanding of this and is agreeable to today's plan.    Medications:  -Continue methylphenidate LA 30 mg p.o. once daily for management of ADHD, combined type.  Last prescription dispensed 7/16/2025.    Labs:  -Urine drug screen obtained 12/31/2024 reviewed and discussed with the patient.  Results within normal limits.    TREATMENT PLAN - SHORT AND LONG-TERM GOALS:   -Continue supportive psychotherapy efforts and medications as indicated. Treatment and medication options discussed during today's visit.   -Patient acknowledged and verbally consented to continue with current treatment plan and was educated on the importance of compliance with treatment and follow-up appointments.    SUMMARY/EDUCATION/DISCUSSION:  -Pt was given appropriate time to ask questions and concerns were  addressed. A thorough discussion was had that included review of disease process, need for continued monitoring and additional treatment options including use of pharmacological and non-pharmacological approaches to care, decisions were made and agreed upon by patient and provider.   -Discussed medication options and treatment plan of prescribed medication as well as the risks, benefits, and side effects including potential falls, possible impaired driving and metabolic adversities among others; patient acknowledged and provided verbal consent.   -Patient has been educated regarding multimodal approach with healthy nutrition, healthy sleep, regular physical activity, social activities, counseling, and medications.  -Please call the office at [phone] within normal business hours (Monday-Friday, 8:00 AM - 4:30 PM) with any worsening of symptoms or onset of intolerable side effects. Please ask to leave a message with office staff.  Please allow up to 24-48 hours for response to a patient call/question/refill request.  -Safety plan has been established and discussed in detail with the patient, who is agreeable to contact support system and/or call 911 or go to the nearest ER should he/she/they have any thoughts of harm to self or others.    MEDS ORDERED DURING VISIT:  No orders of the defined types were placed in this encounter.      FOLLOW UP:  Return in about 12 weeks (around 10/24/2025) for Next scheduled follow up.      Jem Gupta DO    This document has been electronically signed by Jem Gupta DO  August 1, 2025 10:26 EDT    Part of this note may be an electronic transcription/translation of spoken language to printed text using the Dragon Dictation System. Some of the data in this electronic note has been brought forward from a previous encounter, any necessary changes have been made, it has been reviewed by this provider, and it is accurate.     dental pain/injury

## 2025-08-21 DIAGNOSIS — K20.0 EOSINOPHILIC ESOPHAGITIS: ICD-10-CM

## 2025-08-21 RX ORDER — DUPILUMAB 300 MG/2ML
INJECTION, SOLUTION SUBCUTANEOUS
Qty: 8 ML | Refills: 3 | Status: SHIPPED | OUTPATIENT
Start: 2025-08-21

## 2025-08-27 DIAGNOSIS — F90.2 ADHD (ATTENTION DEFICIT HYPERACTIVITY DISORDER), COMBINED TYPE: ICD-10-CM

## 2025-08-27 RX ORDER — METHYLPHENIDATE HYDROCHLORIDE 30 MG/1
30 CAPSULE, EXTENDED RELEASE ORAL DAILY
Qty: 30 CAPSULE | Refills: 0 | Status: SHIPPED | OUTPATIENT
Start: 2025-08-27 | End: 2025-09-26

## (undated) DEVICE — FRCP BX RADJAW4 NDL 2.8 240CM LG OG BX40

## (undated) DEVICE — BLCK/BITE BLOX W/DENTL/RIM W/STRAP 54F

## (undated) DEVICE — ADAPT CLN BIOGUARD AIR/H2O DISP

## (undated) DEVICE — LN SMPL CO2 SHTRM SD STREAM W/M LUER

## (undated) DEVICE — MSK PROC CURAPLEX O2 2/ADAPT 7FT

## (undated) DEVICE — KT ORCA ORCAPOD DISP STRL

## (undated) DEVICE — SENSR O2 OXIMAX FNGR A/ 18IN NONSTR

## (undated) DEVICE — TUBING, SUCTION, 1/4" X 10', STRAIGHT: Brand: MEDLINE